# Patient Record
Sex: FEMALE | Race: WHITE | NOT HISPANIC OR LATINO | Employment: UNEMPLOYED | URBAN - METROPOLITAN AREA
[De-identification: names, ages, dates, MRNs, and addresses within clinical notes are randomized per-mention and may not be internally consistent; named-entity substitution may affect disease eponyms.]

---

## 2017-05-31 PROBLEM — M65.9 TENOSYNOVITIS OF FINGER AND HAND: Status: ACTIVE | Noted: 2017-05-31

## 2017-05-31 PROBLEM — M65.949 TENOSYNOVITIS OF FINGER AND HAND: Status: ACTIVE | Noted: 2017-05-31

## 2017-06-01 PROBLEM — E66.01 MORBID OBESITY: Status: ACTIVE | Noted: 2017-06-01

## 2017-06-01 PROBLEM — Z72.0 TOBACCO ABUSE: Status: ACTIVE | Noted: 2017-06-01

## 2017-06-01 PROBLEM — I10 HTN (HYPERTENSION), BENIGN: Status: ACTIVE | Noted: 2017-06-01

## 2017-06-12 ENCOUNTER — TELEPHONE (OUTPATIENT)
Dept: GASTROENTEROLOGY | Facility: CLINIC | Age: 57
End: 2017-06-12

## 2017-06-12 DIAGNOSIS — Z12.11 SPECIAL SCREENING FOR MALIGNANT NEOPLASMS, COLON: Primary | ICD-10-CM

## 2017-06-12 RX ORDER — SUMATRIPTAN SUCCINATE 25 MG/1
100 TABLET ORAL
Status: ON HOLD | COMMUNITY
End: 2021-12-13 | Stop reason: HOSPADM

## 2017-06-12 RX ORDER — LISINOPRIL 20 MG/1
40 TABLET ORAL DAILY
Status: ON HOLD | COMMUNITY
End: 2021-12-13 | Stop reason: HOSPADM

## 2017-06-12 NOTE — TELEPHONE ENCOUNTER
Reason for Colonoscopy Referral:   Any GI Symptoms: no   Last Colonoscopy: no  History of Colon Polyps: no  Family History of colon cancer or colon polyps (under 50- history of first degree relative w/colon cancer, what family member-age of onset: sister had colon cancer at 60 years of age.   Iron Deficiency/Anemia: no  Meds reviewed and updated: yes  Anti-inflammatory meds/NSAIDS: no  Allergies updated: yes  6 month surgical history: no  Blood Thinners:no   Lung disease: COPD  Heart disease: no  Valve replacement: no  Kidney disease: no  SCHEDULED: 6/23/17, patient will come to clinic to  prep instructions, called lucille into the pharmacy.

## 2017-06-23 PROBLEM — Z12.11 SCREENING FOR COLON CANCER: Status: ACTIVE | Noted: 2017-06-23

## 2017-07-11 ENCOUNTER — TELEPHONE (OUTPATIENT)
Dept: GASTROENTEROLOGY | Facility: CLINIC | Age: 57
End: 2017-07-11

## 2017-07-11 NOTE — LETTER
July 11, 2017    Halima Pruitt  [address] [facility] [address]             [facility]  [address], [address]   [facility] [address]  Phone: [phone]  Fax: [phone] Dear  Halima Pruitt:    Please call our office at your earliest convinience to discuss your pathology results, its urgent that you call us. [phone]        Sincerely,        ISAURA [address] office

## 2017-07-11 NOTE — LETTER
[facility]  [address], [address]   [place] [address]  Phone: [phone]  Fax: [phone]    Pt Name: Halima Pruitt  Injury Date:    Employee ID:  Date of First Treatment: 07/25/2017   Company: Networked reference to record EEP             Appointment Time:  Arrived:    Physician: Hamida Charles MA            Office Treatment: There are no diagnoses linked to this encounter.                Return Appointment: Visit date not found

## 2017-07-11 NOTE — TELEPHONE ENCOUNTER
----- Message from Honey Flores MD sent at 7/10/2017  5:33 PM CDT -----  Patient need to be seen by me in clinic on Wednesday 1 pm. Please call to inform her.

## 2017-07-25 NOTE — ADDENDUM NOTE
Encounter addended by: Hamida Charles MA on: 7/25/2017 10:31 AM<BR>    Actions taken: Letter status changed

## 2018-08-11 ENCOUNTER — ANESTHESIA EVENT (OUTPATIENT)
Dept: SURGERY | Facility: HOSPITAL | Age: 58
End: 2018-08-11
Payer: MEDICAID

## 2018-08-11 ENCOUNTER — ANESTHESIA (OUTPATIENT)
Dept: SURGERY | Facility: HOSPITAL | Age: 58
End: 2018-08-11
Payer: MEDICAID

## 2018-08-11 ENCOUNTER — HOSPITAL ENCOUNTER (OUTPATIENT)
Facility: HOSPITAL | Age: 58
Discharge: HOME | End: 2018-08-12
Attending: EMERGENCY MEDICINE | Admitting: SURGERY
Payer: MEDICAID

## 2018-08-11 DIAGNOSIS — J44.9 CHRONIC OBSTRUCTIVE PULMONARY DISEASE, UNSPECIFIED COPD TYPE: ICD-10-CM

## 2018-08-11 DIAGNOSIS — E66.01 MORBID OBESITY: ICD-10-CM

## 2018-08-11 DIAGNOSIS — K37 APPENDICITIS, UNSPECIFIED APPENDICITIS TYPE: ICD-10-CM

## 2018-08-11 DIAGNOSIS — Z72.0 TOBACCO ABUSE: ICD-10-CM

## 2018-08-11 DIAGNOSIS — Z12.11 SCREENING FOR COLON CANCER: ICD-10-CM

## 2018-08-11 DIAGNOSIS — I10 HTN (HYPERTENSION), BENIGN: ICD-10-CM

## 2018-08-11 DIAGNOSIS — K35.30 ACUTE APPENDICITIS WITH LOCALIZED PERITONITIS: Primary | ICD-10-CM

## 2018-08-11 PROCEDURE — 94799 UNLISTED PULMONARY SVC/PX: CPT

## 2018-08-11 PROCEDURE — 25000003 PHARM REV CODE 250: Performed by: SURGERY

## 2018-08-11 PROCEDURE — 37000008 HC ANESTHESIA 1ST 15 MINUTES: Performed by: SURGERY

## 2018-08-11 PROCEDURE — 93005 ELECTROCARDIOGRAM TRACING: CPT | Mod: 59

## 2018-08-11 PROCEDURE — 88304 TISSUE EXAM BY PATHOLOGIST: CPT | Mod: 26,,, | Performed by: PATHOLOGY

## 2018-08-11 PROCEDURE — 00840 ANES IPER PX LOWER ABD NOS: CPT | Performed by: SURGERY

## 2018-08-11 PROCEDURE — S0077 INJECTION, CLINDAMYCIN PHOSP: HCPCS | Performed by: EMERGENCY MEDICINE

## 2018-08-11 PROCEDURE — 25000003 PHARM REV CODE 250: Performed by: NURSE ANESTHETIST, CERTIFIED REGISTERED

## 2018-08-11 PROCEDURE — 37000009 HC ANESTHESIA EA ADD 15 MINS: Performed by: SURGERY

## 2018-08-11 PROCEDURE — G0378 HOSPITAL OBSERVATION PER HR: HCPCS

## 2018-08-11 PROCEDURE — 99285 EMERGENCY DEPT VISIT HI MDM: CPT | Mod: 25

## 2018-08-11 PROCEDURE — 96374 THER/PROPH/DIAG INJ IV PUSH: CPT | Mod: 59

## 2018-08-11 PROCEDURE — 27000221 HC OXYGEN, UP TO 24 HOURS

## 2018-08-11 PROCEDURE — 63600175 PHARM REV CODE 636 W HCPCS: Performed by: NURSE ANESTHETIST, CERTIFIED REGISTERED

## 2018-08-11 PROCEDURE — 93010 ELECTROCARDIOGRAM REPORT: CPT | Mod: ,,, | Performed by: INTERNAL MEDICINE

## 2018-08-11 PROCEDURE — 36000709 HC OR TIME LEV III EA ADD 15 MIN: Performed by: SURGERY

## 2018-08-11 PROCEDURE — 94640 AIRWAY INHALATION TREATMENT: CPT

## 2018-08-11 PROCEDURE — 25000242 PHARM REV CODE 250 ALT 637 W/ HCPCS: Performed by: EMERGENCY MEDICINE

## 2018-08-11 PROCEDURE — 94761 N-INVAS EAR/PLS OXIMETRY MLT: CPT | Mod: 59

## 2018-08-11 PROCEDURE — 71000033 HC RECOVERY, INTIAL HOUR: Performed by: SURGERY

## 2018-08-11 PROCEDURE — 25000003 PHARM REV CODE 250: Performed by: EMERGENCY MEDICINE

## 2018-08-11 PROCEDURE — 27201423 OPTIME MED/SURG SUP & DEVICES STERILE SUPPLY: Performed by: SURGERY

## 2018-08-11 PROCEDURE — 36000708 HC OR TIME LEV III 1ST 15 MIN: Performed by: SURGERY

## 2018-08-11 PROCEDURE — 88304 TISSUE EXAM BY PATHOLOGIST: CPT | Performed by: PATHOLOGY

## 2018-08-11 PROCEDURE — 96365 THER/PROPH/DIAG IV INF INIT: CPT

## 2018-08-11 RX ORDER — PROPOFOL 10 MG/ML
VIAL (ML) INTRAVENOUS
Status: DISCONTINUED | OUTPATIENT
Start: 2018-08-11 | End: 2018-08-11

## 2018-08-11 RX ORDER — HYDROMORPHONE HYDROCHLORIDE 2 MG/ML
0.5 INJECTION, SOLUTION INTRAMUSCULAR; INTRAVENOUS; SUBCUTANEOUS EVERY 5 MIN PRN
Status: DISCONTINUED | OUTPATIENT
Start: 2018-08-11 | End: 2018-08-11 | Stop reason: HOSPADM

## 2018-08-11 RX ORDER — EPHEDRINE SULFATE 50 MG/ML
INJECTION, SOLUTION INTRAVENOUS
Status: DISCONTINUED | OUTPATIENT
Start: 2018-08-11 | End: 2018-08-11

## 2018-08-11 RX ORDER — DEXAMETHASONE SODIUM PHOSPHATE 4 MG/ML
INJECTION, SOLUTION INTRA-ARTICULAR; INTRALESIONAL; INTRAMUSCULAR; INTRAVENOUS; SOFT TISSUE
Status: DISCONTINUED | OUTPATIENT
Start: 2018-08-11 | End: 2018-08-11

## 2018-08-11 RX ORDER — NEOSTIGMINE METHYLSULFATE 1 MG/ML
INJECTION, SOLUTION INTRAVENOUS
Status: DISCONTINUED | OUTPATIENT
Start: 2018-08-11 | End: 2018-08-11

## 2018-08-11 RX ORDER — ONDANSETRON 2 MG/ML
4 INJECTION INTRAMUSCULAR; INTRAVENOUS ONCE AS NEEDED
Status: DISCONTINUED | OUTPATIENT
Start: 2018-08-11 | End: 2018-08-11 | Stop reason: HOSPADM

## 2018-08-11 RX ORDER — CLINDAMYCIN PHOSPHATE 900 MG/50ML
900 INJECTION, SOLUTION INTRAVENOUS
Status: COMPLETED | OUTPATIENT
Start: 2018-08-11 | End: 2018-08-11

## 2018-08-11 RX ORDER — HYDROCODONE BITARTRATE AND ACETAMINOPHEN 5; 325 MG/1; MG/1
1 TABLET ORAL EVERY 4 HOURS PRN
Status: DISCONTINUED | OUTPATIENT
Start: 2018-08-11 | End: 2018-08-12 | Stop reason: HOSPADM

## 2018-08-11 RX ORDER — LIDOCAINE HCL/PF 100 MG/5ML
SYRINGE (ML) INTRAVENOUS
Status: DISCONTINUED | OUTPATIENT
Start: 2018-08-11 | End: 2018-08-11

## 2018-08-11 RX ORDER — SODIUM CHLORIDE 9 MG/ML
INJECTION, SOLUTION INTRAVENOUS CONTINUOUS
Status: DISCONTINUED | OUTPATIENT
Start: 2018-08-11 | End: 2018-08-12

## 2018-08-11 RX ORDER — ALBUTEROL SULFATE 2.5 MG/.5ML
2.5 SOLUTION RESPIRATORY (INHALATION)
Status: COMPLETED | OUTPATIENT
Start: 2018-08-11 | End: 2018-08-11

## 2018-08-11 RX ORDER — SODIUM CHLORIDE, SODIUM LACTATE, POTASSIUM CHLORIDE, CALCIUM CHLORIDE 600; 310; 30; 20 MG/100ML; MG/100ML; MG/100ML; MG/100ML
1000 INJECTION, SOLUTION INTRAVENOUS
Status: DISCONTINUED | OUTPATIENT
Start: 2018-08-11 | End: 2018-08-12

## 2018-08-11 RX ORDER — SODIUM CHLORIDE 0.9 % (FLUSH) 0.9 %
3 SYRINGE (ML) INJECTION EVERY 8 HOURS
Status: DISCONTINUED | OUTPATIENT
Start: 2018-08-11 | End: 2018-08-11 | Stop reason: HOSPADM

## 2018-08-11 RX ORDER — HYDROCODONE BITARTRATE AND ACETAMINOPHEN 10; 325 MG/1; MG/1
1 TABLET ORAL EVERY 4 HOURS PRN
Status: DISCONTINUED | OUTPATIENT
Start: 2018-08-11 | End: 2018-08-12 | Stop reason: HOSPADM

## 2018-08-11 RX ORDER — BUPIVACAINE HYDROCHLORIDE 2.5 MG/ML
INJECTION, SOLUTION EPIDURAL; INFILTRATION; INTRACAUDAL
Status: DISCONTINUED | OUTPATIENT
Start: 2018-08-11 | End: 2018-08-11 | Stop reason: HOSPADM

## 2018-08-11 RX ORDER — SODIUM CHLORIDE 9 MG/ML
INJECTION, SOLUTION INTRAVENOUS CONTINUOUS PRN
Status: DISCONTINUED | OUTPATIENT
Start: 2018-08-11 | End: 2018-08-11

## 2018-08-11 RX ORDER — SODIUM CHLORIDE 0.9 % (FLUSH) 0.9 %
3 SYRINGE (ML) INJECTION
Status: DISCONTINUED | OUTPATIENT
Start: 2018-08-11 | End: 2018-08-12 | Stop reason: HOSPADM

## 2018-08-11 RX ORDER — HYDROCODONE BITARTRATE AND ACETAMINOPHEN 5; 325 MG/1; MG/1
1 TABLET ORAL EVERY 6 HOURS PRN
Qty: 24 TABLET | Refills: 0 | Status: SHIPPED | OUTPATIENT
Start: 2018-08-11 | End: 2019-01-15

## 2018-08-11 RX ORDER — SUCCINYLCHOLINE CHLORIDE 20 MG/ML
INJECTION INTRAMUSCULAR; INTRAVENOUS
Status: DISCONTINUED | OUTPATIENT
Start: 2018-08-11 | End: 2018-08-11

## 2018-08-11 RX ORDER — GLYCOPYRROLATE 0.2 MG/ML
INJECTION INTRAMUSCULAR; INTRAVENOUS
Status: DISCONTINUED | OUTPATIENT
Start: 2018-08-11 | End: 2018-08-11

## 2018-08-11 RX ORDER — ONDANSETRON 2 MG/ML
INJECTION INTRAMUSCULAR; INTRAVENOUS
Status: DISCONTINUED | OUTPATIENT
Start: 2018-08-11 | End: 2018-08-11

## 2018-08-11 RX ORDER — FENTANYL CITRATE 50 UG/ML
INJECTION, SOLUTION INTRAMUSCULAR; INTRAVENOUS
Status: DISCONTINUED | OUTPATIENT
Start: 2018-08-11 | End: 2018-08-11

## 2018-08-11 RX ORDER — MIDAZOLAM HYDROCHLORIDE 1 MG/ML
INJECTION, SOLUTION INTRAMUSCULAR; INTRAVENOUS
Status: DISCONTINUED | OUTPATIENT
Start: 2018-08-11 | End: 2018-08-11

## 2018-08-11 RX ORDER — ROCURONIUM BROMIDE 10 MG/ML
INJECTION, SOLUTION INTRAVENOUS
Status: DISCONTINUED | OUTPATIENT
Start: 2018-08-11 | End: 2018-08-11

## 2018-08-11 RX ADMIN — GLYCOPYRROLATE 0.4 MG: 0.2 INJECTION, SOLUTION INTRAMUSCULAR; INTRAVENOUS at 08:08

## 2018-08-11 RX ADMIN — DEXAMETHASONE SODIUM PHOSPHATE 4 MG: 4 INJECTION, SOLUTION INTRAMUSCULAR; INTRAVENOUS at 07:08

## 2018-08-11 RX ADMIN — LIDOCAINE HYDROCHLORIDE 80 MG: 20 INJECTION, SOLUTION INTRAVENOUS at 07:08

## 2018-08-11 RX ADMIN — NEOSTIGMINE METHYLSULFATE 5 MG: 1 INJECTION INTRAVENOUS at 08:08

## 2018-08-11 RX ADMIN — PROPOFOL 150 MG: 10 INJECTION, EMULSION INTRAVENOUS at 07:08

## 2018-08-11 RX ADMIN — SODIUM CHLORIDE: 0.9 INJECTION, SOLUTION INTRAVENOUS at 07:08

## 2018-08-11 RX ADMIN — ROCURONIUM BROMIDE 15 MG: 10 INJECTION, SOLUTION INTRAVENOUS at 07:08

## 2018-08-11 RX ADMIN — CLINDAMYCIN IN 5 PERCENT DEXTROSE 900 MG: 18 INJECTION, SOLUTION INTRAVENOUS at 06:08

## 2018-08-11 RX ADMIN — EPHEDRINE SULFATE 10 MG: 50 INJECTION, SOLUTION INTRAMUSCULAR; INTRAVENOUS; SUBCUTANEOUS at 08:08

## 2018-08-11 RX ADMIN — ALBUTEROL SULFATE 2.5 MG: 2.5 SOLUTION RESPIRATORY (INHALATION) at 06:08

## 2018-08-11 RX ADMIN — ONDANSETRON 4 MG: 2 INJECTION, SOLUTION INTRAMUSCULAR; INTRAVENOUS at 07:08

## 2018-08-11 RX ADMIN — SODIUM CHLORIDE, SODIUM LACTATE, POTASSIUM CHLORIDE, AND CALCIUM CHLORIDE 1000 ML: .6; .31; .03; .02 INJECTION, SOLUTION INTRAVENOUS at 06:08

## 2018-08-11 RX ADMIN — SUCCINYLCHOLINE CHLORIDE 120 MG: 20 INJECTION, SOLUTION INTRAMUSCULAR; INTRAVENOUS at 07:08

## 2018-08-11 RX ADMIN — FENTANYL CITRATE 100 MCG: 50 INJECTION, SOLUTION INTRAMUSCULAR; INTRAVENOUS at 07:08

## 2018-08-11 RX ADMIN — SODIUM CHLORIDE: 0.9 INJECTION, SOLUTION INTRAVENOUS at 10:08

## 2018-08-11 RX ADMIN — ROCURONIUM BROMIDE 5 MG: 10 INJECTION, SOLUTION INTRAVENOUS at 07:08

## 2018-08-11 RX ADMIN — MIDAZOLAM 2 MG: 1 INJECTION INTRAMUSCULAR; INTRAVENOUS at 07:08

## 2018-08-11 NOTE — ED NOTES
Patient transferred for acute appendicitis.  Currently denies pain, already has been given IV, Zosyn, Morphine and Zofran prior to arrival.  No needs verbalized when asked

## 2018-08-11 NOTE — ANESTHESIA PREPROCEDURE EVALUATION
08/11/2018  Halima Pruitt is a 58 y.o., female with acute appendicitis. Scheduled for appendectomy.    Past Medical History:   Diagnosis Date    Anxiety     COPD (chronic obstructive pulmonary disease)     Depression     HTN (hypertension), benign 6/1/2017    Seizures     last 34 yreas ago     Past Surgical History:   Procedure Laterality Date    CHOLECYSTECTOMY      COLONOSCOPY N/A 6/23/2017    Procedure: COLONOSCOPY;  Surgeon: Honey Flores MD;  Location: [facility];  Service: Endoscopy;  Laterality: N/A;    HYSTERECTOMY       Review of patient's allergies indicates:   Allergen Reactions    Penicillins Hives    Sulfa (sulfonamide antibiotics) Hives       Recent Labs  Lab 08/11/18  1320   WBC 17.96*   RBC 5.13   HGB 15.1   HCT 47.2      MCV 92   MCH 29.4   MCHC 32.0     BMP  Lab Results   Component Value Date     08/11/2018    K 4.2 08/11/2018     08/11/2018    CO2 28 08/11/2018    BUN 6 (L) 08/11/2018    CREATININE 0.62 08/11/2018    CALCIUM 8.7 08/11/2018    ANIONGAP 9 08/11/2018    ESTGFRAFRICA >60.0 08/11/2018    EGFRNONAA >60.0 08/11/2018     No current facility-administered medications on file prior to encounter.      Current Outpatient Prescriptions on File Prior to Encounter   Medication Sig Dispense Refill    ALBUTEROL SULFATE (VENTOLIN INHL) Inhale into the lungs.      aspirin 81 MG Chew Take 81 mg by mouth once daily.      cyclobenzaprine (FLEXERIL) 10 MG tablet Take 10 mg by mouth 3 (three) times daily as needed for Muscle spasms.      fluticasone-vilanterol (BREO ELLIPTA) 200-25 mcg/dose DsDv diskus inhaler Inhale 1 puff into the lungs once daily. Controller      ibuprofen (ADVIL,MOTRIN) 800 MG tablet Take 800 mg by mouth 3 (three) times daily.      lisinopril (PRINIVIL,ZESTRIL) 20 MG tablet Take 40 mg by mouth once daily.       MOMETASONE/FORMOTEROL  (DULERA INHL) Inhale into the lungs.      omeprazole (PRILOSEC) 40 MG capsule Take 40 mg by mouth once daily.      oxybutynin (DITROPAN) 5 MG Tab Take 5 mg by mouth 3 (three) times daily.       sumatriptan (IMITREX) 25 MG Tab Take 50 mg by mouth every 2 (two) hours as needed.      topiramate (TOPAMAX) 50 MG tablet Take 50 mg by mouth 2 (two) times daily.      trazodone (DESYREL) 100 MG tablet Take 100 mg by mouth every evening.      venlafaxine (EFFEXOR) 75 MG tablet Take 75 mg by mouth once daily.       venlafaxine (EFFEXOR) 75 MG tablet Take 150 mg by mouth once daily.           Anesthesia Evaluation    I have reviewed the Patient Summary Reports.     I have reviewed the Medications.     Review of Systems  Anesthesia Hx:  No problems with previous Anesthesia  History of prior surgery of interest to airway management or planning: Previous anesthesia: General, MAC Denies Family Hx of Anesthesia complications.   Denies Personal Hx of Anesthesia complications.   Social:  Smoker 80 pack years   Hematology/Oncology:         -- Denies Anemia:   Cardiovascular:   Hypertension Denies MI.  Denies CAD.     Denies Angina.    Pulmonary:   COPD, moderate Shortness of breath Getting breathing treatment prior to OR   Renal/:  Renal/ Normal     Hepatic/GI:   appendicitis   Musculoskeletal:  Musculoskeletal Normal    Neurological:  Neurology Normal    Endocrine:  Endocrine Normal    Psych:   Psychiatric History anxiety depression          Physical Exam  General:  Morbid Obesity    Airway/Jaw/Neck:  Airway Findings: Mouth Opening: Normal General Airway Assessment: Adult  Mallampati: III  Improves to II with phonation.  TM Distance: Normal, at least 6 cm  Jaw/Neck Findings:  Neck ROM: Normal ROM      Dental:  Dental Findings: Edentulous   Chest/Lungs:  Chest/Lungs Findings: Expiratory Wheezes, Mod., Decreased Breath Sounds Bilateral     Heart/Vascular:  Heart Findings: Rate: Normal        Mental Status:  Mental Status  Findings:  Alert and Oriented, Anxious         Anesthesia Plan  Type of Anesthesia, risks & benefits discussed:  Anesthesia Type:  general  Patient's Preference:   Intra-op Monitoring Plan: standard ASA monitors  Intra-op Monitoring Plan Comments:   Post Op Pain Control Plan: multimodal analgesia and per primary service following discharge from PACU  Post Op Pain Control Plan Comments:   Induction:   IV  Beta Blocker:  Patient is not currently on a Beta-Blocker (No further documentation required).       Informed Consent: Patient understands risks and agrees with Anesthesia plan.  Questions answered. Anesthesia consent signed with patient.  ASA Score: 3     Day of Surgery Review of History & Physical:    H&P update referred to the surgeon.         Ready For Surgery From Anesthesia Perspective.

## 2018-08-11 NOTE — H&P
Today`s Date: 8/11/2018     Admit Date: 8/11/2018    Admitting Physician: Poncho Bosch MD    Patient`s Name: Halima Pruitt , 58 y.o. female    HISTORY AND CHIEF COMPLAINT  C/o sudden onset of right lower quadrant associated with nausea and vomiting , no diarrhea , no constipation  Patient Active Problem List   Diagnosis    Tenosynovitis of finger and hand    HTN (hypertension), benign    COPD (chronic obstructive pulmonary disease)    Tobacco abuse    Morbid obesity    Screening for colon cancer       Past Medical History:   Diagnosis Date    Anxiety     COPD (chronic obstructive pulmonary disease)     Depression     HTN (hypertension), benign 6/1/2017    Seizures     last 34 yreas ago       Past Surgical History:   Procedure Laterality Date    CHOLECYSTECTOMY      COLONOSCOPY N/A 6/23/2017    Procedure: COLONOSCOPY;  Surgeon: Honey Flores MD;  Location: [facility];  Service: Endoscopy;  Laterality: N/A;    HYSTERECTOMY         Prior to Admission medications    Medication Sig Start Date End Date Taking? Authorizing Provider   ALBUTEROL SULFATE (VENTOLIN INHL) Inhale into the lungs.    Historical Provider, MD   aspirin 81 MG Chew Take 81 mg by mouth once daily.    Historical Provider, MD   cyclobenzaprine (FLEXERIL) 10 MG tablet Take 10 mg by mouth 3 (three) times daily as needed for Muscle spasms.    Historical Provider, MD   fluticasone-vilanterol (BREO ELLIPTA) 200-25 mcg/dose DsDv diskus inhaler Inhale 1 puff into the lungs once daily. Controller    Historical Provider, MD   ibuprofen (ADVIL,MOTRIN) 800 MG tablet Take 800 mg by mouth 3 (three) times daily.    Historical Provider, MD   lisinopril (PRINIVIL,ZESTRIL) 20 MG tablet Take 40 mg by mouth once daily.     Historical Provider, MD   MOMETASONE/FORMOTEROL (DULERA INHL) Inhale into the lungs.    Historical Provider, MD   omeprazole (PRILOSEC) 40 MG capsule Take 40 mg by mouth once daily.    Historical Provider, MD   oxybutynin  (DITROPAN) 5 MG Tab Take 5 mg by mouth 3 (three) times daily.     Historical Provider, MD   sumatriptan (IMITREX) 25 MG Tab Take 50 mg by mouth every 2 (two) hours as needed.    Historical Provider, MD   topiramate (TOPAMAX) 50 MG tablet Take 50 mg by mouth 2 (two) times daily.    Historical Provider, MD   trazodone (DESYREL) 100 MG tablet Take 100 mg by mouth every evening.    Historical Provider, MD   venlafaxine (EFFEXOR) 75 MG tablet Take 75 mg by mouth once daily.     Historical Provider, MD   venlafaxine (EFFEXOR) 75 MG tablet Take 150 mg by mouth once daily.    Historical Provider, MD     Current Facility-Administered Medications on File Prior to Encounter   Medication Dose Route Frequency Provider Last Rate Last Dose    [COMPLETED] dextrose 5 % and 0.45 % NaCl infusion  1,000 mL Intravenous ED 1 Time Abraham Ridley  mL/hr at 08/11/18 1607 1,000 mL at 08/11/18 1607    [COMPLETED] ketorolac injection 15 mg  15 mg Intravenous ED 1 Time Abraham Ridley MD   15 mg at 08/11/18 1347    [COMPLETED] morphine injection 6 mg  6 mg Intravenous ED 1 Time Abraham Ridley MD   6 mg at 08/11/18 1347    [COMPLETED] ondansetron injection 4 mg  4 mg Intravenous ED 1 Time Abraham Ridley MD   4 mg at 08/11/18 1324    [DISCONTINUED] levoFLOXacin 500 mg/100 mL IVPB 500 mg  500 mg Intravenous Q24H Abraham Ridley  mL/hr at 08/11/18 1604 500 mg at 08/11/18 1604    [DISCONTINUED] metronidazole IVPB 500 mg  500 mg Intravenous ED 1 Time Abraham Ridley MD         Current Outpatient Prescriptions on File Prior to Encounter   Medication Sig Dispense Refill    ALBUTEROL SULFATE (VENTOLIN INHL) Inhale into the lungs.      aspirin 81 MG Chew Take 81 mg by mouth once daily.      cyclobenzaprine (FLEXERIL) 10 MG tablet Take 10 mg by mouth 3 (three) times daily as needed for Muscle spasms.      fluticasone-vilanterol (BREO ELLIPTA) 200-25 mcg/dose DsDv diskus inhaler Inhale 1 puff into the lungs once daily.  Controller      ibuprofen (ADVIL,MOTRIN) 800 MG tablet Take 800 mg by mouth 3 (three) times daily.      lisinopril (PRINIVIL,ZESTRIL) 20 MG tablet Take 40 mg by mouth once daily.       MOMETASONE/FORMOTEROL (DULERA INHL) Inhale into the lungs.      omeprazole (PRILOSEC) 40 MG capsule Take 40 mg by mouth once daily.      oxybutynin (DITROPAN) 5 MG Tab Take 5 mg by mouth 3 (three) times daily.       sumatriptan (IMITREX) 25 MG Tab Take 50 mg by mouth every 2 (two) hours as needed.      topiramate (TOPAMAX) 50 MG tablet Take 50 mg by mouth 2 (two) times daily.      trazodone (DESYREL) 100 MG tablet Take 100 mg by mouth every evening.      venlafaxine (EFFEXOR) 75 MG tablet Take 75 mg by mouth once daily.       venlafaxine (EFFEXOR) 75 MG tablet Take 150 mg by mouth once daily.          Review of patient's allergies indicates:   Allergen Reactions    Penicillins Hives    Sulfa (sulfonamide antibiotics) Hives       Social History:   reports that she has been smoking Cigarettes.  She has a 40.00 pack-year smoking history. She has never used smokeless tobacco. She reports that she drinks alcohol. She reports that she does not use drugs.     History reviewed. No pertinent family history.    PHYSICAL EXAMINATION  Temp:  [98 °F (36.7 °C)-98.6 °F (37 °C)] 98.6 °F (37 °C)  Pulse:  [75-88] 78  Resp:  [18-22] 18  SpO2:  [96 %-100 %] 100 %  BP: (129-163)/(65-81) 148/78    General Condition:   alert x 3    Head & Neck  Anemia: None  Jaundice: None  Neck vein: Not distended  Carotid Bruits: none  Lymph nodes: none palpable  Thyroid: normal    Chest: normal    Heart: normal    Rt. Breast: not examined  Lt. Breast: not examined  Axillary lymph nodes: none    Abdomen: Soft,  tender with no palpable mass or organ, mild localized tenderness, scar kailee of cholecystectomy  Hernia: none    Rectal: Defered    Extremities: normal    Vascular: normal    Specific focus Examination    Imp: acute appendicitis , hypertension , obesity,  copd     Plan: Lap Appendectomy possible open now

## 2018-08-11 NOTE — PROVIDER PROGRESS NOTES - EMERGENCY DEPT.
Encounter Date: 8/11/2018    ED Physician Progress Notes       SCRIBE NOTE: I, Laura Briscoe, am scribing for, and in the presence of,  Dr. Bosch.  Physician Statement: I, Dr. Bosch, personally performed the services described in this documentation as scribed by Laura Briscoe in my presence, and it is both accurate and complete.        6:10 PM Case discussed with  of General Surgery, notified patient is in the ED, on the way to see the patient and bring to OR    The patient states her pain is okay at the moment and declines any further pain medication at this time.     6:18 PM Dr. Street is here in the ED about to evaluate the patient    Pre-op EKG:  Normal sinus rhythm at 75 bpm, nl axis, nl intervals, no hypertrophy, no ST-T changes as read by me (Dr. Bosch).    Patient will be taken to the operating room by Dr. Street.     IMPRESSION:  1. Acute appendicitis with localized peritonitis    2. Tobacco abuse    3. Morbid obesity    4. Screening for colon cancer    5. HTN (hypertension), benign    6. Chronic obstructive pulmonary disease, unspecified COPD type    7. Appendicitis, unspecified appendicitis type

## 2018-08-12 VITALS
WEIGHT: 251.31 LBS | OXYGEN SATURATION: 93 % | HEART RATE: 71 BPM | BODY MASS INDEX: 46.25 KG/M2 | RESPIRATION RATE: 18 BRPM | TEMPERATURE: 98 F | HEIGHT: 62 IN | SYSTOLIC BLOOD PRESSURE: 134 MMHG | DIASTOLIC BLOOD PRESSURE: 64 MMHG

## 2018-08-12 PROCEDURE — 27000221 HC OXYGEN, UP TO 24 HOURS

## 2018-08-12 PROCEDURE — G0378 HOSPITAL OBSERVATION PER HR: HCPCS

## 2018-08-12 PROCEDURE — 25000003 PHARM REV CODE 250: Performed by: SURGERY

## 2018-08-12 PROCEDURE — 94799 UNLISTED PULMONARY SVC/PX: CPT

## 2018-08-12 PROCEDURE — 94761 N-INVAS EAR/PLS OXIMETRY MLT: CPT

## 2018-08-12 RX ADMIN — HYDROCODONE BITARTRATE AND ACETAMINOPHEN 1 TABLET: 10; 325 TABLET ORAL at 01:08

## 2018-08-12 NOTE — ANESTHESIA POSTPROCEDURE EVALUATION
"Anesthesia Post Evaluation    Patient: Halima Pruitt    Procedure(s) Performed: Procedure(s) (LRB):  APPENDECTOMY, LAPAROSCOPIC (N/A)    Final Anesthesia Type: general  Patient location during evaluation: PACU  Patient participation: Yes- Able to Participate  Level of consciousness: awake and alert  Post-procedure vital signs: reviewed and stable  Pain management: adequate  Airway patency: patent  PONV status at discharge: No PONV  Anesthetic complications: no      Cardiovascular status: blood pressure returned to baseline  Respiratory status: unassisted and nasal cannula  Hydration status: euvolemic  Follow-up not needed.        Visit Vitals  BP (!) 109/57 (BP Location: Right arm, Patient Position: Lying)   Pulse 93   Temp 36.7 °C (98 °F) (Oral)   Resp 18   Ht 5' 2" (1.575 m)   Wt 108.9 kg (240 lb)   SpO2 (!) 93%   BMI 43.90 kg/m²       Pain/Jessica Score: Presence of Pain: complains of pain/discomfort (8/11/2018  9:08 PM)  Pain Rating Prior to Med Admin: 8 (8/11/2018  1:47 PM)  Jessica Score: 9 (8/11/2018  9:08 PM)      "

## 2018-08-12 NOTE — TRANSFER OF CARE
"Anesthesia Transfer of Care Note    Patient: Halima Pruitt    Procedure(s) Performed: Procedure(s) (LRB):  APPENDECTOMY, LAPAROSCOPIC (N/A)    Patient location: PACU    Anesthesia Type: general    Transport from OR: Transported from OR on 6-10 L/min O2 by face mask with adequate spontaneous ventilation    Post pain: adequate analgesia    Post assessment: tolerated procedure well and no apparent anesthetic complications    Post vital signs: stable    Level of consciousness: awake, alert and oriented    Nausea/Vomiting: no nausea/vomiting    Complications: none    Transfer of care protocol was followed      Last vitals:   Visit Vitals  BP (!) 148/78 (BP Location: Left arm, Patient Position: Sitting)   Pulse 78   Temp 37 °C (98.6 °F) (Oral)   Resp 20   Ht 5' 2" (1.575 m)   Wt 108.9 kg (240 lb)   SpO2 (!) 93%   BMI 43.90 kg/m²     "

## 2018-08-12 NOTE — PROGRESS NOTES
"Surgery follow up  BP (!) 142/74 (Patient Position: Lying)   Pulse 66   Temp 98.4 °F (36.9 °C) (Oral)   Resp 18   Ht 5' 2" (1.575 m)   Wt 114 kg (251 lb 5.2 oz)   SpO2 98%   Breastfeeding? No   BMI 45.97 kg/m²   I/O last 3 completed shifts:  In: 1650 [P.O.:750; I.V.:900]  Out: 700 [Urine:700]  No intake/output data recorded.  Tolerating diet   discharge home today.  "

## 2018-08-12 NOTE — PLAN OF CARE
Discharge orders noted, no HH or HME ordered.    Future Appointments   Date Time Provider Department Center   8/22/2018  1:40 PM KRISSY ParedesCO [facility] [place]       Pt's nurse will go over medications/signs and symptoms prior to discharge       08/12/18 0642   Final Note   Assessment Type Discharge Planning Assessment   Discharge Disposition Home   What phone number can be called within the next 1-3 days to see how you are doing after discharge? [phone]   Hospital Follow Up  Appt(s) scheduled? No  (Offices closed for Weekend, Patient to schedule own follow up appointment)     Reina Diaz, RN Transitional Navigator  [phone]

## 2018-08-12 NOTE — OP NOTE
DATE OF PROCEDURE:  08/11/2018    PREOPERATIVE DIAGNOSIS:  Acute appendicitis, obesity, hypertension, COPD,   chronic smoker.    POSTOPERATIVE DIAGNOSIS:  Acute appendicitis, obesity, hypertension, COPD,   chronic smoker.    OPERATION:  Laparoscopic appendectomy.    SURGEON:  Contreras Street M.D.    ANESTHESIA:  General.    ASSISTANT:  Not applicable.    ESTIMATED BLOOD LOSS:  20 mL.    SPECIMEN REMOVED:  Appendix.    PROCEDURE AND FINDINGS:  This patient was admitted through the Emergency Room as   a transfer from [place] where the patient presented with right lower   quadrant pain.  The patient was worked up and had a CT scan done that showed   acute appendicitis.  The patient was transferred to [place] through the Emergency   Room, was given IV antibiotics, was taken to the Operating Room.  After   satisfactory general endotracheal anesthesia, the patient in supine position,   abdomen was prepped and draped in normal sterile manner using ChloraPrep.  A   small supraumbilical incision was made.  Veress needle was introduced.    Abdominal cavity was insufflated using CO2 up to a pressure of 15 mmHg. A 12 mm   trocar cannula was introduced through the umbilicus and incision, one 5-mm probe   right lower quadrant and one hypogastric area after properly positioning the   patient, mesentery of the appendix was then treated with LigaSure and base of   the appendix was then dissected out.  Camera was then switched and base of the   appendix was treated with Endo-DORI.  Specimen was retrieved through the   umbilical incision.  Hemostasis satisfactorily maintained.  The wound was   irrigated with normal saline solution.  Hemostasis was perfectly maintained.    Abdominal cavity was adequately desufflated.  All ports were withdrawn under   direct vision.  Closure of all wounds was performed, 0 Vicryl for the fascia.    Skin was closed using 4-0 Monocryl.  Sterile gauze dressing was applied.  The   instrument count,  sponge count, and needle count was correct.  The patient   tolerated it well.  Estimated blood loss was 20 mL.    SPECIMEN REMOVED:  Appendix.    DISCHARGE DIAGNOSIS:  Acute appendicitis. hypertension, obesity, COPD.      MS/HN  dd: 08/11/2018 20:31:44 (CDT)  td: 08/11/2018 22:09:40 (CDT)  Doc ID   #[id number]  Job ID #[id number]    CC:

## 2018-08-12 NOTE — OP NOTE
Discharge Summary/Operative Note       Surgery Date: 8/11/2018     Surgeon(s) and Role:     * Contreras Street MD - Primary    Pre-op Diagnosis:  Appendicitis [K37]    Post-op Diagnosis: Post-Op Diagnosis Codes:     * Appendicitis [K37]    Procedure(s) (LRB):  APPENDECTOMY, LAPAROSCOPIC (N/A)    Anesthesia: General    Procedure in Detail/Findings:  Laparoscopic appendectomy done under general anaesthesia . Patient tolerated well. No itraop complication. Patient was snet to recovery room in stable condition.    Estimated Blood Loss: 20 cc         Specimens     Start     Ordered    08/11/18 2003  Specimen to Pathology - Surgery  Once     Appandix 08/11/18 2004        Implants: * No implants in log *           Disposition: PACU - hemodynamically stable.           Condition: Good    Attestation:  I performed the procedure.           Discharge Note    Admit Date: 8/11/2018    Attending Physician: No att. providers found     Discharge Physician: No att. providers found    Final Diagnosis: Post-Op Diagnosis Codes:     * Appendicitis [K37]    Disposition: Still a Patient    Patient Instructions:   Current Discharge Medication List      START taking these medications    Details   HYDROcodone-acetaminophen (NORCO) 5-325 mg per tablet Take 1 tablet by mouth every 6 (six) hours as needed for Pain.  Qty: 24 tablet, Refills: 0         CONTINUE these medications which have NOT CHANGED    Details   ALBUTEROL SULFATE (VENTOLIN INHL) Inhale into the lungs.      aspirin 81 MG Chew Take 81 mg by mouth once daily.      cyclobenzaprine (FLEXERIL) 10 MG tablet Take 10 mg by mouth 3 (three) times daily as needed for Muscle spasms.      fluticasone-vilanterol (BREO ELLIPTA) 200-25 mcg/dose DsDv diskus inhaler Inhale 1 puff into the lungs once daily. Controller      ibuprofen (ADVIL,MOTRIN) 800 MG tablet Take 800 mg by mouth 3 (three) times daily.      lisinopril (PRINIVIL,ZESTRIL) 20 MG tablet Take 40 mg by mouth once daily.        MOMETASONE/FORMOTEROL (DULERA INHL) Inhale into the lungs.      omeprazole (PRILOSEC) 40 MG capsule Take 40 mg by mouth once daily.      oxybutynin (DITROPAN) 5 MG Tab Take 5 mg by mouth 3 (three) times daily.       sumatriptan (IMITREX) 25 MG Tab Take 50 mg by mouth every 2 (two) hours as needed.      topiramate (TOPAMAX) 50 MG tablet Take 50 mg by mouth 2 (two) times daily.      trazodone (DESYREL) 100 MG tablet Take 100 mg by mouth every evening.      !! venlafaxine (EFFEXOR) 75 MG tablet Take 75 mg by mouth once daily.       !! venlafaxine (EFFEXOR) 75 MG tablet Take 150 mg by mouth once daily.       !! - Potential duplicate medications found. Please discuss with provider.           Regular diet, no heavy lifting , return to office one week, , keep dressing dry and intact    Discharge Procedure Orders (must include Diet, Follow-up, Activity)  Diet general     Keep surgical extremity elevated     Lifting restrictions     Call MD for:  temperature >100.4     Call MD for:  persistent nausea and vomiting     Call MD for:  severe uncontrolled pain     Call MD for:  redness, tenderness, or signs of infection (pain, swelling, redness, odor or green/yellow discharge around incision site)     Leave dressing on - Keep it clean, dry, and intact until clinic visit          Discharge Date: 8/12/2018

## 2018-08-12 NOTE — PLAN OF CARE
.Patient has met PACU discharge criteria, VSS, pain well controlled. Family updated by phone. Released from PACU by  *

## 2018-08-12 NOTE — PLAN OF CARE
Problem: Patient Care Overview  Goal: Plan of Care Review  PLAN OF CARE REVIEWED WITH PT. ARRIVED TO UNIT FOLLOWING SURGERY . O22LNC WITH 93 TO 95% SATS . PUNCTURE SITES X 3 NOTED GAUZE 4X4 CDI . RT WRIST 22G SL W/ DSG CDI . RADHA PO DIET POST OP . LIANE SCDS AS ORDERED . WILL CONTINUE TO MONITOR . CALL LIGHT IN REACH .

## 2018-08-12 NOTE — PROGRESS NOTES
Patient is AAOx3, NAD noted, VSS. Dressing changed to three lap sites.  Reviewed discharge instructions with patient.  Patient verbalized understanding.  Dr. Street gave patient paper prescription for pain medication.  Safety maintained.  Waiting on ride.

## 2018-08-12 NOTE — PLAN OF CARE
Problem: Patient Care Overview  Goal: Plan of Care Review  Outcome: Ongoing (interventions implemented as appropriate)  Patient on oxygen with documented flow.  Will attempt to wean per O2 order protocol. Patient performs IS therapy. Will continue to monitor.

## 2018-08-14 NOTE — DISCHARGE SUMMARY
DATE OF ADMISSION: 08/11/2018    DATE OF DISCHARGE:  08/12/2018    HISTORY OF PRESENT ILLNESS:  This 58-year-old female was transferred from [facility] where the patient presented with lower abdominal pain   associated with nausea and vomiting.  The patient was worked up and underwent CT   scan and WBC count was elevated.  The patient showed acute appendicitis.  The   patient gave history of hypertension, COPD, obesity and chronic smoking.  The   patient admitted for observation, taken to the Operating Room and underwent   laparoscopic appendectomy.  The patient was admitted overnight to continue   followup.  The patient was doing much better, was out of bed ambulating, eating,   moving bowels, no fever or chills.  No trouble urinating.  Wound was healing   well.  The patient was afebrile.  The patient was discharged home on p.o. pain   pill.  Advised not to do any heavy lifting, can take shower, keep the dressing   dry and can drive.  To see me in the office in 1 week.  Given prescription for   pain pill, advised to come to the Emergency Room if any abnormal symptoms comes,   fever, increased abdominal pain, nausea, vomiting or any other abdominal   discomfort.    DISCHARGE DIAGNOSIS:  1.  Acute appendicitis.  2.  Hypertension.  3.  Obesity.  4.  Chronic obstructive pulmonary disease.  5.  Chronic smoking.      MS/HN  dd: 08/13/2018 10:13:36 (CDT)  td: 08/13/2018 20:16:56 (CDT)  Doc ID   #[id number]  Job ID #[id number]    CC:

## 2018-08-15 NOTE — ED PROVIDER NOTES
Patient`s Name: Halima Pruitt , 58 y.o. female     HISTORY AND CHIEF COMPLAINT  C/o sudden onset of right lower quadrant associated with nausea and vomiting , no diarrhea , no constipation      Patient Active Problem List   Diagnosis    Tenosynovitis of finger and hand    HTN (hypertension), benign    COPD (chronic obstructive pulmonary disease)    Tobacco abuse    Morbid obesity    Screening for colon cancer              Past Medical History:   Diagnosis Date    Anxiety      COPD (chronic obstructive pulmonary disease)      Depression      HTN (hypertension), benign 6/1/2017    Seizures       last 34 yreas ago               Past Surgical History:   Procedure Laterality Date    CHOLECYSTECTOMY        COLONOSCOPY N/A 6/23/2017     Procedure: COLONOSCOPY;  Surgeon: Honey Flores MD;  Location: [facility];  Service: Endoscopy;  Laterality: N/A;    HYSTERECTOMY                     Prior to Admission medications    Medication Sig Start Date End Date Taking? Authorizing Provider   ALBUTEROL SULFATE (VENTOLIN INHL) Inhale into the lungs.       Historical Provider, MD   aspirin 81 MG Chew Take 81 mg by mouth once daily.       Historical Provider, MD   cyclobenzaprine (FLEXERIL) 10 MG tablet Take 10 mg by mouth 3 (three) times daily as needed for Muscle spasms.       Historical Provider, MD   fluticasone-vilanterol (BREO ELLIPTA) 200-25 mcg/dose DsDv diskus inhaler Inhale 1 puff into the lungs once daily. Controller       Historical Provider, MD   ibuprofen (ADVIL,MOTRIN) 800 MG tablet Take 800 mg by mouth 3 (three) times daily.       Historical Provider, MD   lisinopril (PRINIVIL,ZESTRIL) 20 MG tablet Take 40 mg by mouth once daily.        Historical Provider, MD   MOMETASONE/FORMOTEROL (DULERA INHL) Inhale into the lungs.       Historical Provider, MD   omeprazole (PRILOSEC) 40 MG capsule Take 40 mg by mouth once daily.       Historical Provider, MD   oxybutynin (DITROPAN) 5 MG Tab Take 5 mg by  mouth 3 (three) times daily.        Historical Provider, MD   sumatriptan (IMITREX) 25 MG Tab Take 50 mg by mouth every 2 (two) hours as needed.       Historical Provider, MD   topiramate (TOPAMAX) 50 MG tablet Take 50 mg by mouth 2 (two) times daily.       Historical Provider, MD   trazodone (DESYREL) 100 MG tablet Take 100 mg by mouth every evening.       Historical Provider, MD   venlafaxine (EFFEXOR) 75 MG tablet Take 75 mg by mouth once daily.        Historical Provider, MD   venlafaxine (EFFEXOR) 75 MG tablet Take 150 mg by mouth once daily.       Historical Provider, MD                Current Facility-Administered Medications on File Prior to Encounter   Medication Dose Route Frequency Provider Last Rate Last Dose    [COMPLETED] dextrose 5 % and 0.45 % NaCl infusion  1,000 mL Intravenous ED 1 Time Abraham Ridley  mL/hr at 08/11/18 1607 1,000 mL at 08/11/18 1607    [COMPLETED] ketorolac injection 15 mg  15 mg Intravenous ED 1 Time Abraham Ridley MD   15 mg at 08/11/18 1347    [COMPLETED] morphine injection 6 mg  6 mg Intravenous ED 1 Time Abraham Ridley MD   6 mg at 08/11/18 1347    [COMPLETED] ondansetron injection 4 mg  4 mg Intravenous ED 1 Time Abraham Ridley MD   4 mg at 08/11/18 1324    [DISCONTINUED] levoFLOXacin 500 mg/100 mL IVPB 500 mg  500 mg Intravenous Q24H Abraham Ridley  mL/hr at 08/11/18 1604 500 mg at 08/11/18 1604    [DISCONTINUED] metronidazole IVPB 500 mg  500 mg Intravenous ED 1 Time Abraham Ridley MD                 Current Outpatient Prescriptions on File Prior to Encounter   Medication Sig Dispense Refill    ALBUTEROL SULFATE (VENTOLIN INHL) Inhale into the lungs.        aspirin 81 MG Chew Take 81 mg by mouth once daily.        cyclobenzaprine (FLEXERIL) 10 MG tablet Take 10 mg by mouth 3 (three) times daily as needed for Muscle spasms.        fluticasone-vilanterol (BREO ELLIPTA) 200-25 mcg/dose DsDv diskus inhaler Inhale 1 puff into the lungs once  daily. Controller        ibuprofen (ADVIL,MOTRIN) 800 MG tablet Take 800 mg by mouth 3 (three) times daily.        lisinopril (PRINIVIL,ZESTRIL) 20 MG tablet Take 40 mg by mouth once daily.         MOMETASONE/FORMOTEROL (DULERA INHL) Inhale into the lungs.        omeprazole (PRILOSEC) 40 MG capsule Take 40 mg by mouth once daily.        oxybutynin (DITROPAN) 5 MG Tab Take 5 mg by mouth 3 (three) times daily.         sumatriptan (IMITREX) 25 MG Tab Take 50 mg by mouth every 2 (two) hours as needed.        topiramate (TOPAMAX) 50 MG tablet Take 50 mg by mouth 2 (two) times daily.        trazodone (DESYREL) 100 MG tablet Take 100 mg by mouth every evening.        venlafaxine (EFFEXOR) 75 MG tablet Take 75 mg by mouth once daily.         venlafaxine (EFFEXOR) 75 MG tablet Take 150 mg by mouth once daily.                  Review of patient's allergies indicates:   Allergen Reactions    Penicillins Hives    Sulfa (sulfonamide antibiotics) Hives         Social History:   reports that she has been smoking Cigarettes.  She has a 40.00 pack-year smoking history. She has never used smokeless tobacco. She reports that she drinks alcohol. She reports that she does not use drugs.      History reviewed. No pertinent family history.     PHYSICAL EXAMINATION  Temp:  [98 °F (36.7 °C)-98.6 °F (37 °C)] 98.6 °F (37 °C)  Pulse:  [75-88] 78  Resp:  [18-22] 18  SpO2:  [96 %-100 %] 100 %  BP: (129-163)/(65-81) 148/78     General Condition:   alert x 3     Head & Neck  Anemia: None  Jaundice: None  Neck vein: Not distended  Carotid Bruits: none  Lymph nodes: none palpable  Thyroid: normal     Chest: normal     Heart: normal     Rt. Breast: not examined  Lt. Breast: not examined  Axillary lymph nodes: none     Abdomen: Soft,  tender with no palpable mass or organ, mild localized tenderness, scar kailee of cholecystectomy  Hernia: none     Rectal: Defered     Extremities: normal     Vascular: normal     Specific focus  Examination     Imp: acute appendicitis , hypertension , obesity, copd      Plan: Lap Appendectomy possible open now

## 2018-08-30 ENCOUNTER — OFFICE VISIT (OUTPATIENT)
Dept: SURGERY | Facility: CLINIC | Age: 58
End: 2018-08-30
Payer: MEDICAID

## 2018-08-30 VITALS
OXYGEN SATURATION: 94 % | BODY MASS INDEX: 45.82 KG/M2 | WEIGHT: 250.5 LBS | TEMPERATURE: 98 F | HEART RATE: 98 BPM | SYSTOLIC BLOOD PRESSURE: 114 MMHG | DIASTOLIC BLOOD PRESSURE: 78 MMHG

## 2018-08-30 DIAGNOSIS — Z72.0 TOBACCO ABUSE: ICD-10-CM

## 2018-08-30 DIAGNOSIS — J44.9 CHRONIC OBSTRUCTIVE PULMONARY DISEASE, UNSPECIFIED COPD TYPE: ICD-10-CM

## 2018-08-30 DIAGNOSIS — Z90.49 S/P LAPAROSCOPIC APPENDECTOMY: Primary | ICD-10-CM

## 2018-08-30 DIAGNOSIS — K35.30 ACUTE APPENDICITIS WITH LOCALIZED PERITONITIS: ICD-10-CM

## 2018-08-30 DIAGNOSIS — I10 HTN (HYPERTENSION), BENIGN: ICD-10-CM

## 2018-08-30 DIAGNOSIS — E66.01 MORBID OBESITY: ICD-10-CM

## 2018-08-30 PROCEDURE — 99213 OFFICE O/P EST LOW 20 MIN: CPT | Mod: PBBFAC,PN | Performed by: SURGERY

## 2018-08-30 PROCEDURE — 99204 OFFICE O/P NEW MOD 45 MIN: CPT | Mod: S$PBB,,, | Performed by: SURGERY

## 2018-08-30 PROCEDURE — 99999 PR PBB SHADOW E&M-EST. PATIENT-LVL III: CPT | Mod: PBBFAC,,, | Performed by: SURGERY

## 2018-08-30 RX ORDER — UMECLIDINIUM 62.5 UG/1
AEROSOL, POWDER ORAL
Refills: 5 | COMMUNITY
Start: 2018-08-15 | End: 2022-02-21 | Stop reason: ALTCHOICE

## 2018-08-30 NOTE — PROGRESS NOTES
Subjective:      Patient ID: Halima Pruitt is a 58 y.o. female.    Chief Complaint: Consult ( with Dr. Street)  Patient is status post laparoscopic appendectomy performed by Dr. Street 2 weeks ago at [facility].  She is unable to obtain transportation over to [facility] and her primary care provider referred her to us for evaluation.  She is passing flatus and bowel movement.  Diet is returning to normal. No nausea or vomiting.  Occasional soreness at the umbilical incision.  Ambulating well. She has kept her wounds covered with bandages.  No other complaints.    Past Medical History:   Diagnosis Date    Anxiety     COPD (chronic obstructive pulmonary disease)     Depression     HTN (hypertension), benign 6/1/2017    Seizures     last 34 yreas ago     Past Surgical History:   Procedure Laterality Date    CHOLECYSTECTOMY      HYSTERECTOMY       History reviewed. No pertinent family history.  Social History     Socioeconomic History    Marital status:      Spouse name: None    Number of children: None    Years of education: None    Highest education level: None   Social Needs    Financial resource strain: None    Food insecurity - worry: None    Food insecurity - inability: None    Transportation needs - medical: None    Transportation needs - non-medical: None   Occupational History    None   Tobacco Use    Smoking status: Current Every Day Smoker     Packs/day: 1.00     Years: 40.00     Pack years: 40.00     Types: Cigarettes    Smokeless tobacco: Never Used   Substance and Sexual Activity    Alcohol use: Yes     Comment: once in a while    Drug use: No    Sexual activity: None   Other Topics Concern    None   Social History Narrative    None       Current Outpatient Medications   Medication Sig Dispense Refill    ALBUTEROL SULFATE (VENTOLIN INHL) Inhale into the lungs.      aspirin 81 MG Chew Take 81 mg by mouth once daily.      cyclobenzaprine (FLEXERIL) 10 MG tablet Take 10 mg by mouth 3  (three) times daily as needed for Muscle spasms.      HYDROcodone-acetaminophen (NORCO) 5-325 mg per tablet Take 1 tablet by mouth every 6 (six) hours as needed for Pain. 24 tablet 0    ibuprofen (ADVIL,MOTRIN) 800 MG tablet Take 800 mg by mouth 3 (three) times daily.      INCRUSE ELLIPTA 62.5 mcg/actuation DsDv INHALE 1 PUFF(S) EVERY DAY BY INHALATION ROUTE.  5    lisinopril (PRINIVIL,ZESTRIL) 20 MG tablet Take 40 mg by mouth once daily.       omeprazole (PRILOSEC) 40 MG capsule Take 40 mg by mouth once daily.      oxybutynin (DITROPAN) 5 MG Tab Take 5 mg by mouth 3 (three) times daily.       sumatriptan (IMITREX) 25 MG Tab Take 50 mg by mouth every 2 (two) hours as needed.      topiramate (TOPAMAX) 50 MG tablet Take 50 mg by mouth 2 (two) times daily.      trazodone (DESYREL) 100 MG tablet Take 100 mg by mouth every evening.       No current facility-administered medications for this visit.      Review of patient's allergies indicates:   Allergen Reactions    Penicillins Hives    Sulfa (sulfonamide antibiotics) Hives       ROS:  All systems were reviewed and are negative, except that mentioned in the HPI.    Objective:     Vitals:    08/30/18 1328   BP: 114/78   Pulse: 98   Temp: 97.5 °F (36.4 °C)   SpO2: (!) 94%   Weight: 113.6 kg (250 lb 8 oz)     Physical Exam   Constitutional: She is oriented to person, place, and time. She appears well-developed and well-nourished. No distress.   HENT:   Head: Normocephalic and atraumatic.   Eyes: EOM are normal. Pupils are equal, round, and reactive to light. No scleral icterus.   Neck: Normal range of motion. No JVD present.   Cardiovascular: Normal rate and regular rhythm.   Pulmonary/Chest: Effort normal and breath sounds normal. No respiratory distress.   Abdominal: Soft. Bowel sounds are normal. She exhibits no distension. There is no rebound and no guarding.   Bandages removed, well healed incisions noted, exposed Monocryl sutures removed   Musculoskeletal:  Normal range of motion.   Neurological: She is alert and oriented to person, place, and time. No cranial nerve deficit.   Skin: Skin is warm and dry. She is not diaphoretic.   Psychiatric: She has a normal mood and affect.       Lab Review: CBC:   Lab Results   Component Value Date    WBC 17.96 (H) 08/11/2018    RBC 5.13 08/11/2018    HGB 15.1 08/11/2018    HCT 47.2 08/11/2018     08/11/2018     BMP:   Lab Results   Component Value Date    GLU 90 08/11/2018     08/11/2018    K 4.2 08/11/2018     08/11/2018    CO2 28 08/11/2018    BUN 6 (L) 08/11/2018    CREATININE 0.62 08/11/2018    CALCIUM 8.7 08/11/2018     Lab Results   Component Value Date    ALT 34 08/11/2018    AST 45 08/11/2018    ALKPHOS 113 08/11/2018    BILITOT 0.4 08/11/2018       FINAL PATHOLOGIC DIAGNOSIS  Appendix:  Fecalith;  Acute appendicitis and periappend  Assessment:     1. S/P laparoscopic appendectomy    2. Chronic obstructive pulmonary disease, unspecified COPD type    3. HTN (hypertension), benign    4. Acute appendicitis with localized peritonitis    5. Tobacco abuse    6. Morbid obesity      Plan:   58-year-old female 2 weeks status post lap appy with Dr. Street at [facility]  -patient unable to obtain transportation to [place]  -pt has healed well  -continue postop restrictions for another 4 weeks then gradually resume unrestricted activity  -f/u prn  -all questions answered

## 2018-11-26 PROBLEM — Z78.9 IMPAIRED MOBILITY AND ADLS: Status: ACTIVE | Noted: 2018-11-26

## 2018-11-26 PROBLEM — Z78.9 POOR TOLERANCE FOR ACTIVITY: Status: ACTIVE | Noted: 2018-11-26

## 2018-11-26 PROBLEM — Z74.09 IMPAIRED MOBILITY AND ADLS: Status: ACTIVE | Noted: 2018-11-26

## 2019-01-09 ENCOUNTER — OFFICE VISIT (OUTPATIENT)
Dept: UROLOGY | Facility: CLINIC | Age: 59
End: 2019-01-09
Payer: MEDICAID

## 2019-01-09 VITALS
WEIGHT: 251 LBS | SYSTOLIC BLOOD PRESSURE: 148 MMHG | HEIGHT: 62 IN | HEART RATE: 95 BPM | OXYGEN SATURATION: 97 % | BODY MASS INDEX: 46.19 KG/M2 | RESPIRATION RATE: 18 BRPM | DIASTOLIC BLOOD PRESSURE: 87 MMHG

## 2019-01-09 DIAGNOSIS — R35.1 NOCTURIA: ICD-10-CM

## 2019-01-09 DIAGNOSIS — N39.3 STRESS INCONTINENCE IN FEMALE: ICD-10-CM

## 2019-01-09 DIAGNOSIS — N39.3 SUI (STRESS URINARY INCONTINENCE, FEMALE): Primary | ICD-10-CM

## 2019-01-09 DIAGNOSIS — N39.3 STRESS INCONTINENCE: Primary | ICD-10-CM

## 2019-01-09 DIAGNOSIS — N39.41 URGE URINARY INCONTINENCE: ICD-10-CM

## 2019-01-09 PROCEDURE — 99203 PR OFFICE/OUTPT VISIT, NEW, LEVL III, 30-44 MIN: ICD-10-PCS | Mod: S$PBB,,, | Performed by: NURSE PRACTITIONER

## 2019-01-09 PROCEDURE — 99203 OFFICE O/P NEW LOW 30 MIN: CPT | Mod: S$PBB,,, | Performed by: NURSE PRACTITIONER

## 2019-01-09 PROCEDURE — 87088 URINE BACTERIA CULTURE: CPT

## 2019-01-09 PROCEDURE — 99215 OFFICE O/P EST HI 40 MIN: CPT | Mod: PBBFAC,PO | Performed by: NURSE PRACTITIONER

## 2019-01-09 PROCEDURE — 87077 CULTURE AEROBIC IDENTIFY: CPT

## 2019-01-09 PROCEDURE — 87186 SC STD MICRODIL/AGAR DIL: CPT

## 2019-01-09 PROCEDURE — 81003 URINALYSIS AUTO W/O SCOPE: CPT

## 2019-01-09 PROCEDURE — 87086 URINE CULTURE/COLONY COUNT: CPT

## 2019-01-09 PROCEDURE — 99999 PR PBB SHADOW E&M-EST. PATIENT-LVL V: ICD-10-PCS | Mod: PBBFAC,,, | Performed by: NURSE PRACTITIONER

## 2019-01-09 PROCEDURE — 99999 PR PBB SHADOW E&M-EST. PATIENT-LVL V: CPT | Mod: PBBFAC,,, | Performed by: NURSE PRACTITIONER

## 2019-01-09 RX ORDER — SODIUM CHLORIDE 9 MG/ML
INJECTION, SOLUTION INTRAVENOUS CONTINUOUS
Status: CANCELLED | OUTPATIENT
Start: 2019-01-09

## 2019-01-09 RX ORDER — LIDOCAINE HYDROCHLORIDE 20 MG/ML
JELLY TOPICAL ONCE
Status: CANCELLED | OUTPATIENT
Start: 2019-01-09 | End: 2019-01-09

## 2019-01-09 RX ORDER — CIPROFLOXACIN 2 MG/ML
400 INJECTION, SOLUTION INTRAVENOUS
Status: CANCELLED | OUTPATIENT
Start: 2019-01-09

## 2019-01-09 NOTE — PROGRESS NOTES
"Subjective:       Patient ID: Halima Pruitt is a 58 y.o. female.    Chief Complaint: Urinary Incontinence    Patient is new to me. She is here today for urinary incontinence. She reports she has urinary incontinence with sneezing, laughing, and coughing. It "gooches out". Patient reports she wear depends when she has to leave the house. She reports she has to change every 3 hours when at home ( she wears a pad at home only). Patient currently taking oxybutynin 5 mg for urinary urge incontinence and feels it's helping only in regards to that. Patient has had a hysterectomy. She has three children, all vaginal deliveries without complications. Onset: 3 years ago, but gradually worsening. Patient has tried kegel exercises, but does not find it helpful. Patient reports she would like something done because it's interfering with her ADLS and causing her to be emotionally down. She denies suicidal thoughts.       Review of Systems   Constitutional: Negative for appetite change, chills, fatigue and fever.   Gastrointestinal: Negative for abdominal pain, constipation, diarrhea, nausea and vomiting.   Genitourinary: Negative for decreased urine volume, difficulty urinating, dysuria, flank pain, frequency, hematuria, urgency, vaginal bleeding, vaginal discharge and vaginal pain.        Nocturia x2   Neurological: Negative for dizziness and headaches.   Psychiatric/Behavioral: Negative.        Objective:      Physical Exam   Constitutional: She is oriented to person, place, and time. She appears well-developed and well-nourished.   HENT:   Head: Normocephalic and atraumatic.   Eyes: EOM are normal. Pupils are equal, round, and reactive to light.   Neck: Normal range of motion.   Cardiovascular: Normal rate.   Pulmonary/Chest: Effort normal. No respiratory distress.   Abdominal: Soft. There is no tenderness.   Musculoskeletal: Normal range of motion. She exhibits no edema.   Lymphadenopathy:     She has no cervical " adenopathy.   Neurological: She is alert and oriented to person, place, and time. Coordination normal.   Skin: Skin is warm and dry.   Psychiatric: She has a normal mood and affect. Her behavior is normal. Judgment and thought content normal.   Nursing note and vitals reviewed.      Assessment:       1. QUITA (stress urinary incontinence, female)    2. Urge urinary incontinence    3. Nocturia        Plan:       Halima was seen today for urinary incontinence.    Diagnoses and all orders for this visit:    QUITA (stress urinary incontinence, female)  -     Urinalysis  -     Urine culture    Urge urinary incontinence  -     Urinalysis  -     Urine culture    Nocturia  -     Urinalysis  -     Urine culture    Other orders  1. Continue oxybutynin for urge incontinence.  2. Schedule UDS with Dr. Tompkins.    Follow-up after UDS.     Stacy Ellis NP

## 2019-01-09 NOTE — LETTER
January 9, 2019      Annette Troncoso NP  [address] [facility] [address]           [facility]  [address] [place] [address] [address]  [facility] [address]  Phone: [phone]  Fax: [phone]          Patient: Halima Pruitt   MR Number: [id number]   YOB: 1960   Date of Visit: 1/9/2019       Dear Annette Troncoso:    Thank you for referring Halima Pruitt to me for evaluation. Attached you will find relevant portions of my assessment and plan of care.    If you have questions, please do not hesitate to call me. I look forward to following Halima Pruitt along with you.    Sincerely,    Stacy Ellis NP    Enclosure  CC:  No Recipients    If you would like to receive this communication electronically, please contact [email or web address] or [phone] to request more information on [employer] Link access.    For providers and/or their staff who would like to refer a patient to [facility], please contact us through our one-stop-shop provider referral line, [facility] , at [phone].    If you feel you have received this communication in error or would no longer like to receive these types of communications, please e-mail [email or web address]

## 2019-01-09 NOTE — PATIENT INSTRUCTIONS
1. U/A  2. Urine cx  3. Continue oxybutynin for urge incontinence.  4. Schedule UDS and cystoscopy (for stress urinary incontinence) with Dr. Tompkins for Thursday, 1/17/19.  5. Follow-up after UDS.

## 2019-01-10 LAB
BILIRUB UR QL STRIP: NEGATIVE
CLARITY UR REFRACT.AUTO: CLEAR
COLOR UR AUTO: YELLOW
GLUCOSE UR QL STRIP: NEGATIVE
HGB UR QL STRIP: NEGATIVE
KETONES UR QL STRIP: NEGATIVE
LEUKOCYTE ESTERASE UR QL STRIP: NEGATIVE
NITRITE UR QL STRIP: NEGATIVE
PH UR STRIP: 6 [PH] (ref 5–8)
PROT UR QL STRIP: NEGATIVE
SP GR UR STRIP: 1.01 (ref 1–1.03)
URN SPEC COLLECT METH UR: NORMAL

## 2019-01-12 LAB — BACTERIA UR CULT: NORMAL

## 2019-01-14 DIAGNOSIS — N30.00 ACUTE CYSTITIS WITHOUT HEMATURIA: Primary | ICD-10-CM

## 2019-01-14 RX ORDER — CIPROFLOXACIN 500 MG/1
500 TABLET ORAL EVERY 12 HOURS
Qty: 14 TABLET | Refills: 0 | Status: SHIPPED | OUTPATIENT
Start: 2019-01-14 | End: 2019-01-21

## 2019-01-30 ENCOUNTER — OFFICE VISIT (OUTPATIENT)
Dept: UROLOGY | Facility: CLINIC | Age: 59
End: 2019-01-30
Payer: MEDICAID

## 2019-01-30 VITALS — WEIGHT: 250.69 LBS | HEIGHT: 62 IN | BODY MASS INDEX: 46.13 KG/M2

## 2019-01-30 DIAGNOSIS — R35.0 URINARY FREQUENCY: ICD-10-CM

## 2019-01-30 DIAGNOSIS — R35.1 NOCTURIA: ICD-10-CM

## 2019-01-30 DIAGNOSIS — N39.3 SUI (STRESS URINARY INCONTINENCE, FEMALE): Primary | ICD-10-CM

## 2019-01-30 DIAGNOSIS — E66.01 MORBID OBESITY: ICD-10-CM

## 2019-01-30 PROCEDURE — 99999 PR PBB SHADOW E&M-EST. PATIENT-LVL IV: ICD-10-PCS | Mod: PBBFAC,,, | Performed by: UROLOGY

## 2019-01-30 PROCEDURE — 99214 OFFICE O/P EST MOD 30 MIN: CPT | Mod: S$PBB,,, | Performed by: UROLOGY

## 2019-01-30 PROCEDURE — 99214 PR OFFICE/OUTPT VISIT, EST, LEVL IV, 30-39 MIN: ICD-10-PCS | Mod: S$PBB,,, | Performed by: UROLOGY

## 2019-01-30 PROCEDURE — 99999 PR PBB SHADOW E&M-EST. PATIENT-LVL IV: CPT | Mod: PBBFAC,,, | Performed by: UROLOGY

## 2019-01-30 PROCEDURE — 99214 OFFICE O/P EST MOD 30 MIN: CPT | Mod: PBBFAC,PO | Performed by: UROLOGY

## 2019-01-30 RX ORDER — CIPROFLOXACIN 2 MG/ML
400 INJECTION, SOLUTION INTRAVENOUS
Status: CANCELLED | OUTPATIENT
Start: 2019-01-30

## 2019-01-30 RX ORDER — SODIUM CHLORIDE 9 MG/ML
INJECTION, SOLUTION INTRAVENOUS CONTINUOUS
Status: CANCELLED | OUTPATIENT
Start: 2019-01-30

## 2019-01-30 RX ORDER — LIDOCAINE HYDROCHLORIDE 20 MG/ML
JELLY TOPICAL ONCE
Status: CANCELLED | OUTPATIENT
Start: 2019-01-30 | End: 2019-01-30

## 2019-01-30 NOTE — PROGRESS NOTES
Subjective:       Patient ID: Halima Pruitt is a 58 y.o. female.    Chief Complaint: Post-op Evaluation    59 yo WF with Type III QUITA x 2 years. Verified on VUDS. Cystocele grade I. Here to plan PV Sling.      Other   This is a chronic (QUITA) problem. The current episode started more than 1 year ago (~ 2 years with Severe QUITA. ( Saturating pads)). The problem occurs constantly. The problem has been gradually worsening. Associated symptoms include urinary symptoms. Pertinent negatives include no abdominal pain, anorexia, arthralgias, change in bowel habit, chest pain, chills, congestion, coughing, diaphoresis, fatigue, fever, headaches, joint swelling, myalgias, nausea, neck pain, numbness, rash, sore throat, swollen glands, vertigo, visual change, vomiting or weakness. She has tried nothing for the symptoms.     Review of Systems   Constitutional: Negative for activity change, appetite change, chills, diaphoresis, fatigue and fever.   HENT: Negative for congestion, ear pain, hearing loss, nosebleeds, sinus pressure, sore throat and trouble swallowing.    Eyes: Negative for pain and visual disturbance.   Respiratory: Negative for apnea, cough and shortness of breath.    Cardiovascular: Negative for chest pain and leg swelling.   Gastrointestinal: Negative for abdominal distention, abdominal pain, anal bleeding, anorexia, blood in stool, change in bowel habit, constipation, diarrhea, nausea, rectal pain and vomiting.   Endocrine: Negative for cold intolerance, heat intolerance, polydipsia, polyphagia and polyuria.   Genitourinary: Positive for dysuria, frequency and urgency. Negative for decreased urine volume, difficulty urinating, dyspareunia, enuresis, flank pain, genital sores, hematuria, menstrual problem, pelvic pain, vaginal bleeding, vaginal discharge and vaginal pain.   Musculoskeletal: Negative for arthralgias, back pain, joint swelling, myalgias and neck pain.   Skin: Negative for color change, pallor and  rash.   Allergic/Immunologic: Negative for environmental allergies, food allergies and immunocompromised state.   Neurological: Negative for dizziness, vertigo, speech difficulty, weakness, numbness and headaches.   Hematological: Negative for adenopathy. Does not bruise/bleed easily.   Psychiatric/Behavioral: Negative.        Objective:      Physical Exam   Nursing note and vitals reviewed.  Constitutional: She is oriented to person, place, and time. She appears well-developed and well-nourished.   HENT:   Head: Normocephalic.   Nose: Nose normal.   Mouth/Throat: Oropharynx is clear and moist.   Eyes: Conjunctivae and EOM are normal. Pupils are equal, round, and reactive to light.   Neck: Normal range of motion. Neck supple.   Cardiovascular: Normal rate, regular rhythm, normal heart sounds and intact distal pulses.    Pulmonary/Chest: Effort normal and breath sounds normal.   Abdominal: Soft. Bowel sounds are normal.   Genitourinary:   Genitourinary Comments: Grade I cystocele, Type III QUITA.   Musculoskeletal: Normal range of motion.   Neurological: She is alert and oriented to person, place, and time. She has normal reflexes.   Skin: Skin is warm and dry.     Psychiatric: She has a normal mood and affect. Her behavior is normal. Judgment and thought content normal.       Assessment:       1. QUITA (stress urinary incontinence, female)    2. Morbid obesity    3. Nocturia    4. Urinary frequency        Plan:       Patient Instructions   Schedule PV Sling 2/7/19 at [facility].   QUITA (stress urinary incontinence, female)  -     Case Request Operating Room: INSERTION, PUBOVAGINAL SLING, WITH CYSTOSCOPY  -     Vital Signs ; Standing  -     Notify physician ; Standing  -     Diet NPO; Standing  -     Basic metabolic panel; Future; Expected date: 01/30/2019  -     CBC auto differential; Future; Expected date: 01/30/2019  -     Urinalysis; Future; Expected date: 01/30/2019  -     Urine culture; Future; Expected date:  01/30/2019  -     Place in Outpatient; Standing  -     Insert peripheral IV; Standing    Morbid obesity  -     Case Request Operating Room: INSERTION, PUBOVAGINAL SLING, WITH CYSTOSCOPY  -     Vital Signs ; Standing  -     Notify physician ; Standing  -     Diet NPO; Standing  -     Basic metabolic panel; Future; Expected date: 01/30/2019  -     CBC auto differential; Future; Expected date: 01/30/2019  -     Urinalysis; Future; Expected date: 01/30/2019  -     Urine culture; Future; Expected date: 01/30/2019  -     Place in Outpatient; Standing  -     Insert peripheral IV; Standing    Nocturia  -     Case Request Operating Room: INSERTION, PUBOVAGINAL SLING, WITH CYSTOSCOPY  -     Vital Signs ; Standing  -     Notify physician ; Standing  -     Diet NPO; Standing  -     Basic metabolic panel; Future; Expected date: 01/30/2019  -     CBC auto differential; Future; Expected date: 01/30/2019  -     Urinalysis; Future; Expected date: 01/30/2019  -     Urine culture; Future; Expected date: 01/30/2019  -     Place in Outpatient; Standing  -     Insert peripheral IV; Standing    Urinary frequency  -     Case Request Operating Room: INSERTION, PUBOVAGINAL SLING, WITH CYSTOSCOPY  -     Vital Signs ; Standing  -     Notify physician ; Standing  -     Diet NPO; Standing  -     Basic metabolic panel; Future; Expected date: 01/30/2019  -     CBC auto differential; Future; Expected date: 01/30/2019  -     Urinalysis; Future; Expected date: 01/30/2019  -     Urine culture; Future; Expected date: 01/30/2019  -     Place in Outpatient; Standing  -     Insert peripheral IV; Standing    Other orders  -     0.9%  NaCl infusion  -     IP VTE LOW RISK PATIENT; Standing  -     Progressive Mobility Protocol (mobilize patient to their highest level of functioning at least twice daily); Standing  -     ciprofloxacin (CIPRO)400mg/200ml D5W IVPB 400 mg  -     lidocaine HCl 2% urojet

## 2019-02-27 ENCOUNTER — OFFICE VISIT (OUTPATIENT)
Dept: UROLOGY | Facility: CLINIC | Age: 59
End: 2019-02-27
Payer: MEDICAID

## 2019-02-27 VITALS — HEIGHT: 62 IN | WEIGHT: 250 LBS | BODY MASS INDEX: 46.01 KG/M2

## 2019-02-27 DIAGNOSIS — Z98.890 POST-OPERATIVE STATE: ICD-10-CM

## 2019-02-27 DIAGNOSIS — N39.3 SUI (STRESS URINARY INCONTINENCE, FEMALE): Primary | ICD-10-CM

## 2019-02-27 PROCEDURE — 99999 PR PBB SHADOW E&M-EST. PATIENT-LVL III: CPT | Mod: PBBFAC,,, | Performed by: UROLOGY

## 2019-02-27 PROCEDURE — 99024 PR POST-OP FOLLOW-UP VISIT: ICD-10-PCS | Mod: ,,, | Performed by: UROLOGY

## 2019-02-27 PROCEDURE — 99024 POSTOP FOLLOW-UP VISIT: CPT | Mod: ,,, | Performed by: UROLOGY

## 2019-02-27 PROCEDURE — 99213 OFFICE O/P EST LOW 20 MIN: CPT | Mod: PBBFAC,PO | Performed by: UROLOGY

## 2019-02-27 PROCEDURE — 99999 PR PBB SHADOW E&M-EST. PATIENT-LVL III: ICD-10-PCS | Mod: PBBFAC,,, | Performed by: UROLOGY

## 2019-02-27 RX ORDER — ESTRADIOL 0.1 MG/G
1 CREAM VAGINAL
Qty: 42.5 G | Refills: 2 | Status: SHIPPED | OUTPATIENT
Start: 2019-02-28 | End: 2023-03-20

## 2019-02-27 NOTE — PROGRESS NOTES
"Halima Pruitt is a 58 y.o. female patient.   1. QUITA (stress urinary incontinence, female)    2. Post-operative state      Past Medical History:   Diagnosis Date    Anxiety     COPD (chronic obstructive pulmonary disease)     Depression     HTN (hypertension), benign 6/1/2017    Seizures     last 34 yreas ago     Past Surgical History Pertinent Negatives:   Procedure Date Noted    TONSILLECTOMY 05/22/2015     Scheduled Meds:  Continuous Infusions:  PRN Meds:    Review of patient's allergies indicates:   Allergen Reactions    Penicillins Hives     Pt stated "anything that has 'cillin' in the name or ends in 'cillin'.    Sulfa (sulfonamide antibiotics) Hives     There are no hospital problems to display for this patient.    Height 5' 2" (1.575 m), weight 113.4 kg (250 lb).    Subjective:   Diet: Adequate intake.  Patient reports no nausea or vomiting.    Activity level: Returning to normal.    Pain control: Well controlled.      Objective:  Vital signs (most recent): Height 5' 2" (1.575 m), weight 113.4 kg (250 lb).  General appearance: Comfortable, well-appearing, in no acute distress and not in pain.    Lungs:  Normal respiratory rate and normal effort.  Breath sounds normal.    Heart: Normal rate.  Regular rhythm.  S1 normal.    Chest: Symmetric chest wall expansion.    Abdomen: Abdomen is soft.    Bowel sounds:  Bowel sounds are normal.    Tenderness: There is no abdominal tenderness tenderness.    Wound:  Clean.    Extremities: There is normal range of motion.    Neurological: The patient is alert and oriented to person, place and time.  Normal strength.  Pupils are equal, round, and reactive to light.       Assessment:   Post-op: 20 days.    Condition: In stable condition.     Plan:  Encourage ambulation (Kegal Exercises).  Regular diet.  Resume oral medications.      Patient Instructions   F/U 3 months         Nain Tompkins MD  2/27/2019  "

## 2019-05-01 ENCOUNTER — OFFICE VISIT (OUTPATIENT)
Dept: UROLOGY | Facility: CLINIC | Age: 59
End: 2019-05-01
Payer: MEDICAID

## 2019-05-01 VITALS — HEIGHT: 62 IN | BODY MASS INDEX: 46.01 KG/M2 | WEIGHT: 250 LBS

## 2019-05-01 DIAGNOSIS — Z98.890 POST-OPERATIVE STATE: ICD-10-CM

## 2019-05-01 DIAGNOSIS — R35.1 NOCTURIA: Primary | ICD-10-CM

## 2019-05-01 DIAGNOSIS — E66.01 MORBID OBESITY: ICD-10-CM

## 2019-05-01 DIAGNOSIS — N39.3 STRESS INCONTINENCE IN FEMALE: ICD-10-CM

## 2019-05-01 PROCEDURE — 99213 OFFICE O/P EST LOW 20 MIN: CPT | Mod: PBBFAC,PO | Performed by: UROLOGY

## 2019-05-01 PROCEDURE — 99024 POSTOP FOLLOW-UP VISIT: CPT | Mod: ,,, | Performed by: UROLOGY

## 2019-05-01 PROCEDURE — 99999 PR PBB SHADOW E&M-EST. PATIENT-LVL III: CPT | Mod: PBBFAC,,, | Performed by: UROLOGY

## 2019-05-01 PROCEDURE — 99999 PR PBB SHADOW E&M-EST. PATIENT-LVL III: ICD-10-PCS | Mod: PBBFAC,,, | Performed by: UROLOGY

## 2019-05-01 PROCEDURE — 99024 PR POST-OP FOLLOW-UP VISIT: ICD-10-PCS | Mod: ,,, | Performed by: UROLOGY

## 2019-05-01 NOTE — PROGRESS NOTES
"Halima Pruitt is a 58 y.o. female patient.   1. Nocturia    2. Morbid obesity    3. Stress incontinence in female    4. Post-operative state      Past Medical History:   Diagnosis Date    Anxiety     COPD (chronic obstructive pulmonary disease)     Depression     HTN (hypertension), benign 6/1/2017    Seizures     last 34 yreas ago     Past Surgical History Pertinent Negatives:   Procedure Date Noted    TONSILLECTOMY 05/22/2015     Scheduled Meds:  Continuous Infusions:  PRN Meds:    Review of patient's allergies indicates:   Allergen Reactions    Penicillins Hives     Pt stated "anything that has 'cillin' in the name or ends in 'cillin'.    Sulfa (sulfonamide antibiotics) Hives     There are no hospital problems to display for this patient.    Height 5' 2" (1.575 m), weight 113.4 kg (250 lb).    Subjective:   Diet: Adequate intake.  Patient reports no nausea or vomiting.    Activity level: Normal.    Pain control: Well controlled.      Objective:  Vital signs (most recent): Height 5' 2" (1.575 m), weight 113.4 kg (250 lb).  General appearance: Comfortable, well-appearing, in no acute distress and not in pain.    Lungs:  Normal respiratory rate and normal effort.  Breath sounds normal.    Heart: Normal rate.  Regular rhythm.  S1 normal.    Abdomen: Abdomen is soft.    Bowel sounds:  Bowel sounds are normal.    Tenderness: There is no abdominal tenderness tenderness.    Wound:  Clean.  There is no drainage.    Extremities: There is normal range of motion.    Neurological: The patient is alert and oriented to person, place and time.  Normal strength.  Pupils are equal, round, and reactive to light.       Assessment & Plan       Nain Tompkins MD  5/1/2019  "

## 2019-05-02 ENCOUNTER — TELEPHONE (OUTPATIENT)
Dept: ORTHOPEDICS | Facility: CLINIC | Age: 59
End: 2019-05-02

## 2019-05-02 ENCOUNTER — TELEPHONE (OUTPATIENT)
Dept: SPORTS MEDICINE | Facility: CLINIC | Age: 59
End: 2019-05-02

## 2019-05-02 NOTE — TELEPHONE ENCOUNTER
Called patient to get her set up with Dr Myers at the [place] office. Patient is scheduled to see us in 5/8/19. Patient verbalized understanding of appt.

## 2019-05-02 NOTE — TELEPHONE ENCOUNTER
Called and let patient know that Dr. Bryant does not see wrist and hand. She understood and gave permission for me to send her information to Dr. Myers's staff to schedule.

## 2019-05-08 ENCOUNTER — OFFICE VISIT (OUTPATIENT)
Dept: ORTHOPEDICS | Facility: CLINIC | Age: 59
End: 2019-05-08
Payer: MEDICAID

## 2019-05-08 VITALS — HEIGHT: 62 IN | WEIGHT: 250 LBS | BODY MASS INDEX: 46.01 KG/M2

## 2019-05-08 DIAGNOSIS — G56.21 CUBITAL TUNNEL SYNDROME ON RIGHT: Primary | ICD-10-CM

## 2019-05-08 PROCEDURE — 99999 PR PBB SHADOW E&M-EST. PATIENT-LVL III: CPT | Mod: PBBFAC,,, | Performed by: ORTHOPAEDIC SURGERY

## 2019-05-08 PROCEDURE — 99213 OFFICE O/P EST LOW 20 MIN: CPT | Mod: PBBFAC,PN | Performed by: ORTHOPAEDIC SURGERY

## 2019-05-08 PROCEDURE — 99999 PR PBB SHADOW E&M-EST. PATIENT-LVL III: ICD-10-PCS | Mod: PBBFAC,,, | Performed by: ORTHOPAEDIC SURGERY

## 2019-05-08 PROCEDURE — 99204 OFFICE O/P NEW MOD 45 MIN: CPT | Mod: S$PBB,,, | Performed by: ORTHOPAEDIC SURGERY

## 2019-05-08 PROCEDURE — 99204 PR OFFICE/OUTPT VISIT, NEW, LEVL IV, 45-59 MIN: ICD-10-PCS | Mod: S$PBB,,, | Performed by: ORTHOPAEDIC SURGERY

## 2019-05-08 NOTE — PROGRESS NOTES
[facility]  History & Physical  Orthopedics    SUBJECTIVE:      Chief Complaint:  Right wrist radial sided pain, right hand numbness and tingling    Referring Provider: Self, Aaareferral     History of Present Illness:  Patient is a 58 y.o. right hand dominant female who presents today with complaints of right radial wrist pain as well as numbness and tingling in the right small finger ring finger and long finger.  She reports the wrist pain has been present for approximately 2 months and that the numbness and tingling has been present for approximately 2 weeks.  She denies any other complaints today presents for initial evaluation. Of note she is status post bilateral carpal tunnel releases done remotely approximately 15 years ago by an unknown provider which she has done well from.  She reports that her pain at the radial aspect of her right wrist is 10/10 in intensity and significant and affects her activities of daily living.  She presents today for evaluation with no other complaints.    The patient is a/an part-time newspaper .    Onset of symptoms/DOI was 1-2 months for the right radial wrist pain in approximately 1-2 weeks for the numbness and tingling..    Symptoms are aggravated by activity and movement.    Symptoms are alleviated by rest.    Symptoms consist of pain and Numbness and tingling.    The patient rates their pain as a 10/10.    Attempted treatment(s) and/or interventions include rest.     The patient denies any fevers, chills, N/V, D/C and presents for evaluation.       Past Medical History:   Diagnosis Date    Anxiety     COPD (chronic obstructive pulmonary disease)     Depression     HTN (hypertension), benign 6/1/2017    Seizures     last 34 yreas ago     Past Surgical History:   Procedure Laterality Date    APPENDECTOMY, LAPAROSCOPIC N/A 8/11/2018    Performed by [address]re[facility] MD at [facility] OR    CHOLECYSTECTOMY      COLONOSCOPY N/A 6/23/2017     "Performed by Honey Flores [facility] at [facility]    CYSTOSCOPY, WITH URODYNAMIC TESTING N/A 1/17/2019    Performed by [facility]ain Tompkins [facility] at [facility] OR    HYSTERECTOMY      INCISION AND DRAINAGE (I&D) Left 6/1/2017    Performed by Roger Gale MD at [facility] OR    INSERTION, PUBOVAGINAL SLING, WITH CYSTOSCOPY N/A 2/7/2019    Performed by [facility]ain Tompkins [facility] at [facility] OR    pilonidal cystectomy       Review of patient's allergies indicates:   Allergen Reactions    Penicillins Hives     Pt stated "anything that has 'cillin' in the name or ends in 'cillin'.    Sulfa (sulfonamide antibiotics) Hives     Social History     Social History Narrative    Not on file     No family history on file.      Current Outpatient Medications:     acetaminophen (TYLENOL) 325 MG tablet, Take 1 tablet (325 mg total) by mouth every 6 (six) hours as needed for Pain., Disp: , Rfl:     ALBUTEROL SULFATE (VENTOLIN INHL), Inhale into the lungs., Disp: , Rfl:     aspirin 81 MG Chew, Take 81 mg by mouth once daily., Disp: , Rfl:     busPIRone (BUSPAR) 15 MG tablet, Take 15 mg by mouth 2 (two) times daily., Disp: , Rfl:     clotrimazole (LOTRIMIN) 1 % cream, Apply topically 2 (two) times daily. Apply to affected area twice daily after washing with soap and water, Disp: 113 g, Rfl: 11    cyclobenzaprine (FLEXERIL) 10 MG tablet, Take 10 mg by mouth 3 (three) times daily as needed for Muscle spasms., Disp: , Rfl:     estradiol (ESTRACE) 0.01 % (0.1 mg/gram) vaginal cream, Place 1 g vaginally twice a week., Disp: 42.5 g, Rfl: 2    HYDROcodone-acetaminophen (NORCO)  mg per tablet, Take 1 tablet by mouth every 6 (six) hours as needed for Pain., Disp: 20 tablet, Rfl: 0    ibuprofen (ADVIL,MOTRIN) 800 MG tablet, Take 800 mg by mouth every 6 (six) hours as needed. , Disp: , Rfl:     INCRUSE ELLIPTA 62.5 mcg/actuation DsDv, INHALE 1 PUFF(S) EVERY DAY BY INHALATION ROUTE., Disp: , Rfl: 5    lisinopril (PRINIVIL,ZESTRIL) 20 MG tablet, " "Take 40 mg by mouth once daily. , Disp: , Rfl:     omeprazole (PRILOSEC) 40 MG capsule, Take 40 mg by mouth daily as needed. , Disp: , Rfl:     oxybutynin (DITROPAN) 5 MG Tab, Take 5 mg by mouth 3 (three) times daily. , Disp: , Rfl:     sumatriptan (IMITREX) 25 MG Tab, Take 100 mg by mouth every 2 (two) hours as needed. , Disp: , Rfl:     tamsulosin (FLOMAX) 0.4 mg Cap, Take 1 capsule (0.4 mg total) by mouth every evening., Disp: 30 capsule, Rfl: 0    topiramate (TOPAMAX) 50 MG tablet, Take 50 mg by mouth every evening. , Disp: , Rfl:     trazodone (DESYREL) 100 MG tablet, Take 100 mg by mouth every evening., Disp: , Rfl:       Review of Systems:  Constitutional: no fever or chills  Eyes: no visual changes  ENT: no nasal congestion or sore throat  Respiratory: no cough or shortness of breath  Cardiovascular: no chest pain  Gastrointestinal: no nausea or vomiting, tolerating diet  Musculoskeletal: pain, soreness and numbness/tingling    OBJECTIVE:      Vital Signs (Most Recent):  Vitals:    05/08/19 1017   Weight: 113.4 kg (250 lb)   Height: 5' 2" (1.575 m)     Body mass index is 45.73 kg/m².      Physical Exam:  Constitutional: The patient appears well-developed and well-nourished. No distress.   Head: Normocephalic and atraumatic.   Nose: Nose normal.   Eyes: Conjunctivae and EOM are normal.   Neck: No tracheal deviation present.   Cardiovascular: Normal rate and intact distal pulses.    Pulmonary/Chest: Effort normal. No respiratory distress.   Abdominal: There is no guarding.   Neurological: The patient is alert.   Psychiatric: The patient has a normal mood and affect.     Right Hand/Wrist Examination:    Observation/Inspection:  Swelling  none    Deformity  none  Discoloration  none     Scars   bilateral extensile carpal tunnel    Atrophy  none    HAND/WRIST EXAMINATION:  Finkelstein's Test   positive  WHAT Test    positive  Snuff box tenderness   Neg  Broderick's Test    Neg  Hook of [place]ate " Tenderness  Neg  CMC grind    Neg  Circumduction test   Neg    Neurovascular Exam:  Digits WWP, brisk CR < 3s throughout  NVI motor/LTS to M/R/U nerves, radial pulse 2+  Tinel's Test - Carpal Tunnel  Neg  Tinel's Test - Cubital Tunnel  positive  Phalen's Test    Neg  Median Nerve Compression Test Neg    ROM hand/wrist/elbow full, painless    Diagnostic Results:     Xray -  x-rays of the patient's right hand demonstrate no acute fractures or dislocations or significant degenerative changes  EMG - none    ASSESSMENT/PLAN:      58 y.o. yo female with right de Quervain tenosynovitis, right cubital tunnel syndrome  Plan:  At this time I held an extensive discussion of the patient regarding treatment options. For her de Quervain tenosynovitis we discussed NSAIDs occupational therapy steroid injections thumb spica bracing and surgery. She would like to attempt a trial of thumb spica bracing and NSAID medications which I feel is reasonable.  I discussed that her numbness and tingling is concerning for cubital tunnel syndrome.  I would like to further evaluate this with an EMG nerve conduction study. She will follow up after her EMG for re-evaluation          Braden Myers M.D.

## 2019-07-24 ENCOUNTER — TELEPHONE (OUTPATIENT)
Dept: GASTROENTEROLOGY | Facility: CLINIC | Age: 59
End: 2019-07-24

## 2019-08-07 ENCOUNTER — PROCEDURE VISIT (OUTPATIENT)
Dept: NEUROLOGY | Facility: CLINIC | Age: 59
End: 2019-08-07
Payer: MEDICAID

## 2019-08-07 DIAGNOSIS — G56.21 CUBITAL TUNNEL SYNDROME ON RIGHT: ICD-10-CM

## 2019-08-07 PROCEDURE — 95912 PR NERVE CONDUCTION STUDY; 11 -12 STUDIES: ICD-10-PCS | Mod: 26,S$PBB,, | Performed by: PSYCHIATRY & NEUROLOGY

## 2019-08-07 PROCEDURE — 95886 MUSC TEST DONE W/N TEST COMP: CPT | Mod: 26,S$PBB,, | Performed by: PSYCHIATRY & NEUROLOGY

## 2019-08-07 PROCEDURE — 95886 MUSC TEST DONE W/N TEST COMP: CPT | Mod: PBBFAC | Performed by: PSYCHIATRY & NEUROLOGY

## 2019-08-07 PROCEDURE — 95912 NRV CNDJ TEST 11-12 STUDIES: CPT | Mod: 26,S$PBB,, | Performed by: PSYCHIATRY & NEUROLOGY

## 2019-08-07 PROCEDURE — 95913 NRV CNDJ TEST 13/> STUDIES: CPT | Mod: PBBFAC | Performed by: PSYCHIATRY & NEUROLOGY

## 2019-08-07 PROCEDURE — 95912 NRV CNDJ TEST 11-12 STUDIES: CPT | Mod: PBBFAC | Performed by: PSYCHIATRY & NEUROLOGY

## 2019-08-07 PROCEDURE — 95886 PR EMG COMPLETE, W/ NERVE CONDUCTION STUDIES, 5+ MUSCLES: ICD-10-PCS | Mod: 26,S$PBB,, | Performed by: PSYCHIATRY & NEUROLOGY

## 2019-08-13 ENCOUNTER — TELEPHONE (OUTPATIENT)
Dept: GASTROENTEROLOGY | Facility: CLINIC | Age: 59
End: 2019-08-13

## 2019-08-13 DIAGNOSIS — Z86.010 PERSONAL HISTORY OF COLONIC POLYPS: Primary | ICD-10-CM

## 2019-08-13 RX ORDER — POLYETHYLENE GLYCOL 3350, SODIUM SULFATE ANHYDROUS, SODIUM BICARBONATE, SODIUM CHLORIDE, POTASSIUM CHLORIDE 236; 22.74; 6.74; 5.86; 2.97 G/4L; G/4L; G/4L; G/4L; G/4L
POWDER, FOR SOLUTION ORAL
Qty: 1 BOTTLE | Refills: 0 | Status: SHIPPED | OUTPATIENT
Start: 2019-08-13 | End: 2022-02-08

## 2019-08-13 NOTE — TELEPHONE ENCOUNTER
Spoke with patient and she states that she rec'd a text stating her procedure is scheduled on 8/20 but her chart shows 8/30. I explained to her that I don't show where anything is scheduled on 8/20 for her.

## 2019-08-13 NOTE — TELEPHONE ENCOUNTER
GOLYTELY/ COLYTE/ NULYTELY Instructions    You are scheduled for a colonoscopy with Dr. Carvalho on Friday, August 30 at [facility] located at [address] [place] [address] in [place].  Enter through the [facility] Entrance.  Check-in at Same Day Surgery.      You will receive a call 1-2 days before your colonoscopy to tell you the time to arrive.  If you have not received a call by the day before your procedure, call the Endoscopy Lab at [phone].    To ensure that your test is accurate and complete, you MUST follow these instructions listed below.  If you have any questions, please call our office at [phone].  Plan on being at the hospital for your procedure for 3-4 hours.    1.  Follow a CLEAR LIQUID DIET for the entire day before your scheduled colonoscopy.  This means no solid food the entire day starting when you wake.  You may have as much of the clear liquids as you want throughout the day.   CLEAR LIQUID DIET:   - Avoid Red, Orange, Purple, and/or Blue food coloring   - NO DAIRY   - You can have:  Coffee with sugar (no creamer), tea, water, soda, apple or white grape juice, chicken or beef broth/bouillon (no meat, noodles, or veggies), green/yellow popsicles, green/yellow Jell-O, lemonade.    2.  MIX GOLYTELY/COLYTE/NULYTELY (all names for same product) WITH ONE (1) GALLON OF WATER.  YOU MAY ADD A FLAVOR PACKET OR YELLOW/GREEN POWDER DRINK MIX TO THIS.  PUT IN REFRIGERATOR.  This is easier to drink if this solution is cold, so you can mix the solution one day ahead of time and place in the refrigerator prior to drinking.  You have to drink the solution within 24-36 hours of mixing it.  Do NOT put this solution over ice.  It IS ok to drink with a straw.    3. AT 5 PM THE DAY BEFORE YOUR COLONOSCOPY, DRINK ONE (1) 8 OUNCE GLASS OF MIXTURE EVERY 10 MINUTES UNTIL HALF OF THE GALLON IS CONSUMED.  Keep this mixture cold and in refrigerator as much as you can while drinking it.  Place the remaining  half of mixture in the refrigerator when you finish the first half.    4.  The endoscopy department will call you 2 days before your colonoscopy to tell you the exact time to arrive, AND to tell you the exact time to drink the 2nd portion of your prep (which will be FIVE HOURS BEFORE YOUR ARRIVAL TIME).  At this time given to you, DRINK ONE (1) 8 OUNCE GLASS OF MIXTURE EVERY 10 MINUTES UNTIL THE OTHER HALF IS CONSUMED. Keep the mixture cold while you are drinking it. Once this is complete, you may not have ANYTHING else by mouth!      5.  You must have someone with you to DRIVE YOU HOME since you will be receiving IV sedation for the colonoscopy.    6.  It is ok to take your heart, blood pressure, and seizure medications in the morning of your test with a SIP of water.  Hold other medications until after your procedure.  Do NOT have anything else to eat or drink the morning of your colonoscopy.  It is ok to brush your teeth.    7.  If you are on blood thinners THAT YOU HAVE BEEN INSTRUCTED TO HOLD BY YOUR DOCTOR FOR THIS PROCEDURE, then do NOT take this the morning of your colonoscopy.  Do NOT stop these medications on your own, they must be approved to be held by your doctor.  Your colonoscopy can NOT be done if you are on these medications.  Examples of blood thinners include: Coumadin, Aggrenox, Plavix, Pradaxa, Reapro, Pletal, Xarelto, Ticagrelor, Brilinta, Eliquis, and high dose aspirin (325 mg).  You do not have to stop baby aspirin 81 mg.    8.  IF YOU ARE DIABETIC:  NO INSULIN OR ORAL MEDICATIONS THE MORNING OF THE COLONOSCOPY.  TAKE ONLY HALF THE DOSE OF YOUR INSULIN THE DAY BEFORE THE COLONOSCOPY.  DO NOT TAKE ANY ORAL DIABETIC MEDICATIONS THE DAY BEFORE THE COLONOSCOPY.  IF YOU ARE AN INSULIN DEPENDENT DIABETIC WITH UNSTABLE BLOOD SUGARS, NOTIFY YOUR PRIMARY CARE PHYSICIAN FOR INSTRUCTIONS.

## 2019-08-13 NOTE — TELEPHONE ENCOUNTER
----- Message from Selena Christina sent at 8/13/2019  3:39 PM CDT -----  Contact: [phone]/mwjc  Patient is requesting a call back regarding confirming the date of her colonoscopy. She got a text saying 08/20. But she can not do it that date. Her chart says 08/30. Please call her to confirm. Thanks

## 2019-08-13 NOTE — TELEPHONE ENCOUNTER
Pt is in clinic today with her sister (who is the pt for today) and states that she needs a screening colonoscopy.  She denies FH of colon cancer, heart issues, lung or kidney issues.  She had colonoscopy 6/2017 with polyps and was told to repeat in 1 year due to prep.    Golytely sent, and she also needs to do Miralax bid for the 7 days prior to colonoscopy.  Prep instructions given.

## 2020-05-21 ENCOUNTER — TELEPHONE (OUTPATIENT)
Dept: GASTROENTEROLOGY | Facility: CLINIC | Age: 60
End: 2020-05-21

## 2020-12-03 ENCOUNTER — TELEPHONE (OUTPATIENT)
Dept: GASTROENTEROLOGY | Facility: CLINIC | Age: 60
End: 2020-12-03

## 2020-12-03 NOTE — LETTER
December 3, 2020    Halima Pruitt  [address] [facility] [address]             [facility] - Gastro - [address]   [address], [facility]   [facility] [address]  Phone: [phone]  Fax: [phone] Dear Ms. Pruitt:    We have contacted you to schedule a screening colonoscopy that was ordered by your doctor. Please contact the office to schedule at [phone].      If you have any questions or concerns, please don't hesitate to call.    Sincerely,        Ninoska Carvalho MD

## 2020-12-03 NOTE — TELEPHONE ENCOUNTER
Spoke to patient about scheduling a screening colonoscopy. Patient states she has get her insurance straightened out first before she can do anything. Patient is getting bills in the mail that she shouldn't be getting.

## 2021-06-18 ENCOUNTER — TELEPHONE (OUTPATIENT)
Dept: GASTROENTEROLOGY | Facility: CLINIC | Age: 61
End: 2021-06-18

## 2021-12-08 PROBLEM — M06.9 RHEUMATOID ARTHRITIS: Status: ACTIVE | Noted: 2020-06-04

## 2021-12-08 PROBLEM — J96.01 ACUTE HYPOXEMIC RESPIRATORY FAILURE: Status: ACTIVE | Noted: 2021-12-08

## 2021-12-08 PROBLEM — J18.9 BILATERAL PNEUMONIA: Status: ACTIVE | Noted: 2021-12-08

## 2021-12-08 PROBLEM — I10 ESSENTIAL HYPERTENSION: Status: ACTIVE | Noted: 2017-08-02

## 2021-12-08 PROBLEM — R73.03 PREDIABETES: Status: ACTIVE | Noted: 2019-10-15

## 2021-12-13 PROBLEM — J96.01 ACUTE HYPOXEMIC RESPIRATORY FAILURE: Status: RESOLVED | Noted: 2021-12-08 | Resolved: 2021-12-13

## 2022-02-03 ENCOUNTER — TELEPHONE (OUTPATIENT)
Dept: GASTROENTEROLOGY | Facility: CLINIC | Age: 62
End: 2022-02-03
Payer: MEDICAID

## 2022-02-08 ENCOUNTER — OFFICE VISIT (OUTPATIENT)
Dept: GASTROENTEROLOGY | Facility: CLINIC | Age: 62
End: 2022-02-08
Payer: MEDICAID

## 2022-02-08 VITALS
HEIGHT: 63 IN | WEIGHT: 255.81 LBS | SYSTOLIC BLOOD PRESSURE: 150 MMHG | HEART RATE: 85 BPM | BODY MASS INDEX: 45.32 KG/M2 | DIASTOLIC BLOOD PRESSURE: 87 MMHG

## 2022-02-08 DIAGNOSIS — Z01.818 PREOPERATIVE EXAMINATION: ICD-10-CM

## 2022-02-08 DIAGNOSIS — E66.01 CLASS 3 SEVERE OBESITY DUE TO EXCESS CALORIES WITH SERIOUS COMORBIDITY AND BODY MASS INDEX (BMI) OF 45.0 TO 49.9 IN ADULT: ICD-10-CM

## 2022-02-08 DIAGNOSIS — Z12.11 ENCOUNTER FOR COLONOSCOPY DUE TO HISTORY OF ADENOMATOUS COLONIC POLYPS: Primary | ICD-10-CM

## 2022-02-08 DIAGNOSIS — Z86.010 ENCOUNTER FOR COLONOSCOPY DUE TO HISTORY OF ADENOMATOUS COLONIC POLYPS: Primary | ICD-10-CM

## 2022-02-08 DIAGNOSIS — K59.04 CHRONIC IDIOPATHIC CONSTIPATION: ICD-10-CM

## 2022-02-08 PROBLEM — M51.16 RADICULOPATHY DUE TO LUMBAR INTERVERTEBRAL DISC DISORDER: Status: ACTIVE | Noted: 2019-06-10

## 2022-02-08 PROBLEM — Z86.0101 ENCOUNTER FOR COLONOSCOPY DUE TO HISTORY OF ADENOMATOUS COLONIC POLYPS: Status: ACTIVE | Noted: 2022-02-08

## 2022-02-08 PROBLEM — J18.9 BILATERAL PNEUMONIA: Status: RESOLVED | Noted: 2021-12-08 | Resolved: 2022-02-08

## 2022-02-08 PROCEDURE — 99999 PR PBB SHADOW E&M-EST. PATIENT-LVL V: ICD-10-PCS | Mod: PBBFAC,,, | Performed by: NURSE PRACTITIONER

## 2022-02-08 PROCEDURE — 3079F DIAST BP 80-89 MM HG: CPT | Mod: CPTII,,, | Performed by: NURSE PRACTITIONER

## 2022-02-08 PROCEDURE — 99999 PR PBB SHADOW E&M-EST. PATIENT-LVL V: CPT | Mod: PBBFAC,,, | Performed by: NURSE PRACTITIONER

## 2022-02-08 PROCEDURE — 1160F PR REVIEW ALL MEDS BY PRESCRIBER/CLIN PHARMACIST DOCUMENTED: ICD-10-PCS | Mod: CPTII,,, | Performed by: NURSE PRACTITIONER

## 2022-02-08 PROCEDURE — 1159F MED LIST DOCD IN RCRD: CPT | Mod: CPTII,,, | Performed by: NURSE PRACTITIONER

## 2022-02-08 PROCEDURE — 3079F PR MOST RECENT DIASTOLIC BLOOD PRESSURE 80-89 MM HG: ICD-10-PCS | Mod: CPTII,,, | Performed by: NURSE PRACTITIONER

## 2022-02-08 PROCEDURE — 99215 OFFICE O/P EST HI 40 MIN: CPT | Mod: PBBFAC,PO | Performed by: NURSE PRACTITIONER

## 2022-02-08 PROCEDURE — 99203 PR OFFICE/OUTPT VISIT, NEW, LEVL III, 30-44 MIN: ICD-10-PCS | Mod: S$PBB,,, | Performed by: NURSE PRACTITIONER

## 2022-02-08 PROCEDURE — 3077F SYST BP >= 140 MM HG: CPT | Mod: CPTII,,, | Performed by: NURSE PRACTITIONER

## 2022-02-08 PROCEDURE — 3077F PR MOST RECENT SYSTOLIC BLOOD PRESSURE >= 140 MM HG: ICD-10-PCS | Mod: CPTII,,, | Performed by: NURSE PRACTITIONER

## 2022-02-08 PROCEDURE — 99203 OFFICE O/P NEW LOW 30 MIN: CPT | Mod: S$PBB,,, | Performed by: NURSE PRACTITIONER

## 2022-02-08 PROCEDURE — 1159F PR MEDICATION LIST DOCUMENTED IN MEDICAL RECORD: ICD-10-PCS | Mod: CPTII,,, | Performed by: NURSE PRACTITIONER

## 2022-02-08 PROCEDURE — 4010F ACE/ARB THERAPY RXD/TAKEN: CPT | Mod: CPTII,,, | Performed by: NURSE PRACTITIONER

## 2022-02-08 PROCEDURE — 4010F PR ACE/ARB THEARPY RXD/TAKEN: ICD-10-PCS | Mod: CPTII,,, | Performed by: NURSE PRACTITIONER

## 2022-02-08 PROCEDURE — 1160F RVW MEDS BY RX/DR IN RCRD: CPT | Mod: CPTII,,, | Performed by: NURSE PRACTITIONER

## 2022-02-08 PROCEDURE — 3008F PR BODY MASS INDEX (BMI) DOCUMENTED: ICD-10-PCS | Mod: CPTII,,, | Performed by: NURSE PRACTITIONER

## 2022-02-08 PROCEDURE — 3008F BODY MASS INDEX DOCD: CPT | Mod: CPTII,,, | Performed by: NURSE PRACTITIONER

## 2022-02-08 RX ORDER — POLYETHYLENE GLYCOL 3350 17 G/17G
17 POWDER, FOR SOLUTION ORAL DAILY
Qty: 510 G | Refills: 11 | Status: SHIPPED | OUTPATIENT
Start: 2022-02-08

## 2022-02-08 RX ORDER — SODIUM, POTASSIUM,MAG SULFATES 17.5-3.13G
1 SOLUTION, RECONSTITUTED, ORAL ORAL DAILY
Qty: 1 KIT | Refills: 0 | Status: SHIPPED | OUTPATIENT
Start: 2022-02-08 | End: 2022-02-11

## 2022-02-08 NOTE — PATIENT INSTRUCTIONS
SUPREP Instructions    You are scheduled for a colonoscopy with Dr. Carvalho on 2/18/2022 at [facility] in [place], [place].  To ensure that your test is accurate and complete, you MUST follow these instructions listed below.  If you have any questions, please call our office at [phone].  Plan on being at the hospital for your procedure for 3-4 hours.    2 DAYS BEFORE YOUR COLONOSCOPY: Wednesday, 2/16/2022   1.  Eat a normal breakfast and lunch, but after lunch you will start your CLEAR LIQUID DIET.  FOR SUPPER/DINNER YOU WILL HAVE CLEAR LIQUIDS.    CLEAR LIQUID DIET:   - Avoid Red, Orange, Purple, and/or Blue food coloring   - NO DAIRY   - You can have:  Coffee with sugar (no creamer), tea, water, soda, apple or white grape juice, chicken or beef broth/bouillon (no meat, noodles, or veggies), green/yellow popsicles, green/yellow Jell-O, lemonade.     2.  At 4pm, drink ONE ENTIRE BOTTLE OF MAGNESIUM CITRATE     1 DAY BEFORE YOUR COLONOSCOPY: Thursday, 2/17/2022  1.  Follow a CLEAR LIQUID DIET for the entire day before your scheduled colonoscopy.  This means no solid food the entire day starting when you wake.  You may have as much of the clear liquids as you want throughout the day.   CLEAR LIQUID DIET:   - Avoid Red, Orange, Purple, and/or Blue food coloring   - NO DAIRY   - You can have:  Coffee with sugar (no creamer), tea, water, soda, apple or white grape juice, chicken or beef broth/bouillon (no meat, noodles, or veggies), green/yellow popsicles, green/yellow Jell-O, lemonade.    2.  AT 5 pm the evening before your colonoscopy, POUR ONE (1) BOTTLE OF SUPREP INTO THE MIXING CONTAINER, PROVIDED INSIDE THE BOX.  ADD WATER TO THE LINE ON THE CONTAINER AND MIX IT WELL.  DRINK THE ENTIRE CONTAINER AND THEN DRINK TWO (2) MORE CONTAINERS OF WATER OVER THE NEXT 1 HOUR.  This is sometimes easier to drink if this solution is cold, so you can mix the solution 20 minutes ahead of time and place in the refrigerator  prior to drinking.  You have to drink the solution within 30-45 minutes of mixing it.  Do NOT put this solution over ice.  It IS ok to drink with a straw.    3.  The endoscopy department will call you 1 day before your colonoscopy to tell you the exact time to arrive, AND to tell you the exact time to drink the 2nd portion of your prep (which will be FIVE HOURS BEFORE YOUR ARRIVAL TIME).  At this time given to you, POUR ONE (1) BOTTLE OF SUPREP INTO THE MIXING CONTAINER, PROVIDED INSIDE THE BOX.  ADD WATER TO THE LINE ON THE CONTAINER AND MIX IT WELL.  DRINK THE ENTIRE CONTAINER AND THEN DRINK TWO (2) MORE CONTAINERS OF WATER OVER THE NEXT 1 HOUR.  This is sometimes easier to drink if this solution is cold, so you can mix the solution 20 minutes ahead of time and place in the refrigerator prior to drinking.  You have to drink the solution within 30-45 minutes of mixing it.  Do NOT put this solution over ice.  It IS ok to drink with a straw.  Once this is complete, you may not have ANYTHING else by mouth!    4.  You must have someone with you to DRIVE YOU HOME since you will be receiving IV sedation for the colonoscopy.    5.  It is ok to take MOST of your REGULAR MEDICATIONS  in the morning of your test with a SIP of water.  THE ONLY MEDS YOU NEED TO HOLD ARE YOUR DIABETES MEDICATIONS,  SOME BLOOD PRESSURE MEDS, AND BLOOD THINNERS IF OK'D BY YOUR DOCTOR.  Do NOT have anything else to eat or drink the morning of your colonoscopy.  It is ok to brush your teeth.    6.  If you are on blood thinners THAT YOU HAVE BEEN INSTRUCTED TO HOLD BY YOUR DOCTOR FOR THIS PROCEDURE, then do NOT take this the morning of your colonoscopy.  Do NOT stop these medications on your own, they must be approved to be held by your doctor.  Your colonoscopy can NOT be done if you are on these medications.  Examples of blood thinners include: Coumadin, Aggrenox, Plavix, Pradaxa, Reapro, Pletal, Xarelto, Ticagrelor, Brilinta, Eliquis, and high  dose aspirin (325 mg).  You do not have to stop baby aspirin 81 mg.    7.  IF YOU ARE DIABETIC:  NO INSULIN OR ORAL MEDICATIONS THE MORNING OF THE COLONOSCOPY.  TAKE ONLY HALF THE DOSE OF YOUR INSULIN THE DAY BEFORE THE COLONOSCOPY.  DO NOT TAKE ANY ORAL DIABETIC MEDICATIONS THE DAY BEFORE THE COLONOSCOPY.  IF YOU ARE AN INSULIN DEPENDENT DIABETIC WITH UNSTABLE BLOOD SUGARS, NOTIFY YOUR PRIMARY CARE PHYSICIAN FOR INSTRUCTIONS.

## 2022-02-08 NOTE — PROGRESS NOTES
Subjective:       Patient ID: Halima Pruitt is a 61 y.o. female.    Chief Complaint: Follow-up (Colonoscopy )    62 y/o female with hypertension, COPD, constipation, and hx of colon polyps presents to clinic to schedule colonoscopy. Repeat colonoscopy several years overdue. Colonoscopy in 2017 with recommendations to repeat in 1 year for surveillance. Denies abdominal pain, weight loss, fatigue, hematochezia or melena. Occasional constipation-takes mag citrate prn. FH includes sister with colon cancer.     Old records from chart reviewed and summarized, significant for:  - 6/23/2017 colonoscopy revealed one 12 mm polyp in the descending colon, removed using injection-lift and a hot snare. Resected and retrieved. One 8 mm polyp in the sigmoid colon, removed with a cold snare. Resected and retrieved. Diverticulosis in the sigmoid colon and in the descending colon.    Current labs requested from referring provider at [facility] in [place], [place].         Past Medical History:   Diagnosis Date    Anxiety     Bilateral pneumonia 12/8/2021    COPD (chronic obstructive pulmonary disease)     Depression     HTN (hypertension), benign 6/1/2017    Seizures     last 34 yreas ago       Past Surgical History:   Procedure Laterality Date    CHOLECYSTECTOMY      COLONOSCOPY N/A 6/23/2017    Procedure: COLONOSCOPY;  Surgeon: Honey Flores MD;  Location: [facility] ENDO;  Service: Endoscopy;  Laterality: N/A;    HYSTERECTOMY      LAPAROSCOPIC APPENDECTOMY N/A 8/11/2018    Procedure: APPENDECTOMY, LAPAROSCOPIC;  Surgeon: Contreras Street MD;  Location: [facility] OR;  Service: General;  Laterality: N/A;    pilonidal cystectomy         History reviewed. No pertinent family history.    Social History     Socioeconomic History    Marital status:    Tobacco Use    Smoking status: Current Every Day Smoker     Packs/day: 0.25     Years: 40.00     Pack years: 10.00     Types: Cigarettes    Smokeless tobacco: Never  "Used   Substance and Sexual Activity    Alcohol use: Yes     Comment: once in a while    Drug use: No       Review of Systems   Constitutional: Negative for appetite change and unexpected weight change.   HENT: Negative for trouble swallowing.    Respiratory: Negative for shortness of breath.    Cardiovascular: Negative for chest pain.   Gastrointestinal: Positive for constipation. Negative for abdominal pain.   Hematological: Negative for adenopathy.   Psychiatric/Behavioral: Negative for dysphoric mood.         Objective:     Vitals:    02/08/22 1532   BP: (!) 150/87   Pulse: 85   Weight: 116 kg (255 lb 12.8 oz)   Height: 5' 3" (1.6 m)          Physical Exam  Constitutional:       General: She is not in acute distress.     Appearance: She is obese. She is not ill-appearing.   HENT:      Head: Normocephalic.   Eyes:      Conjunctiva/sclera: Conjunctivae normal.      Pupils: Pupils are equal, round, and reactive to light.   Cardiovascular:      Rate and Rhythm: Normal rate and regular rhythm.   Pulmonary:      Effort: Pulmonary effort is normal. No respiratory distress.      Breath sounds: Normal breath sounds.   Musculoskeletal:         General: No swelling.      Cervical back: Normal range of motion.   Skin:     General: Skin is warm and dry.   Neurological:      Mental Status: She is alert and oriented to person, place, and time.   Psychiatric:         Mood and Affect: Mood normal.         Behavior: Behavior normal.               Assessment:         ICD-10-CM ICD-9-CM   1. Encounter for colonoscopy due to history of adenomatous colonic polyps  Z12.11 V76.51    Z86.010 V12.72   2. Preoperative examination  Z01.818 V72.84   3. Chronic idiopathic constipation  K59.04 564.00   4. Class 3 severe obesity due to excess calories with serious comorbidity and body mass index (BMI) of 45.0 to 49.9 in adult  E66.01 278.01    Z68.42 V85.42       Plan:       Encounter for colonoscopy due to history of adenomatous colonic " polyps  -     Schedule colonoscopy 2/18/2022; 2 day bowel prep  -     SUPREP BOWEL PREP KIT 17.5-3.13-1.6 gram SolR; Take 177 mLs by mouth once daily. for 2 days  Dispense: 1 kit; Refill: 0    Preoperative examination  -     COVID-19 Routine Screening; Future; Expected date: 02/08/2022    Chronic idiopathic constipation  -     polyethylene glycol (GLYCOLAX) 17 gram/dose powder; Take 17 g by mouth once daily.  Dispense: 510 g; Refill: 11    Class 3 severe obesity due to excess calories with serious comorbidity and body mass index (BMI) of 45.0 to 49.9 in adult        -     Weight loss advised. Dietary and exercise         counseling done.        Follow up if symptoms worsen or fail to improve.     Patient's Medications   New Prescriptions    POLYETHYLENE GLYCOL (GLYCOLAX) 17 GRAM/DOSE POWDER    Take 17 g by mouth once daily.    SUPREP BOWEL PREP KIT 17.5-3.13-1.6 GRAM SOLR    Take 177 mLs by mouth once daily. for 2 days   Previous Medications    ACETAMINOPHEN (TYLENOL) 325 MG TABLET    Take 1 tablet (325 mg total) by mouth every 6 (six) hours as needed for Pain.    ALBUTEROL (PROVENTIL/VENTOLIN HFA) 90 MCG/ACTUATION INHALER    Inhale 2 puffs by mouth every 4 hours by inhalation route as needed.    ALBUTEROL SULFATE (VENTOLIN INHL)    Inhale into the lungs.    ALBUTEROL-IPRATROPIUM (DUO-NEB) 2.5 MG-0.5 MG/3 ML NEBULIZER SOLUTION    Take 3 mLs by nebulization every 6 (six) hours as needed for Wheezing or Shortness of Breath. Rescue    AMMONIUM LACTATE 12 % CREA    Apply 1 application twice a day by topical route.    ASPIRIN 81 MG CHEW    Take 81 mg by mouth once daily.    BUSPIRONE (BUSPAR) 30 MG TAB    Take 1 tablet(s) twice a day by oral route as needed.    CLOTRIMAZOLE (LOTRIMIN) 1 % CREAM    Apply topically 2 (two) times daily. Apply to affected area twice daily after washing with soap and water    CYCLOBENZAPRINE (FLEXERIL) 10 MG TABLET    TAKE 1 TABLET(S) 3 TIMES A DAY BY ORAL ROUTE AS NEEDED    DULOXETINE  (CYMBALTA) 60 MG CAPSULE    Take 1 capsule(s) by mouth every day    ERGOCALCIFEROL (ERGOCALCIFEROL) 50,000 UNIT CAP    Take 1 capsule (50,000 Units total) by mouth every 7 days.    ESTRADIOL (ESTRACE) 0.01 % (0.1 MG/GRAM) VAGINAL CREAM    Place 1 g vaginally twice a week.    FLUTICASONE PROPIONATE (FLONASE) 50 MCG/ACTUATION NASAL SPRAY    2 sprays in each nostril once a day    FLUTICASONE-SALMETEROL DISKUS INHALER 250-50 MCG    Inhale 1 puff twice a day by inhalation route as directed.    FUROSEMIDE (LASIX) 20 MG TABLET    Take 1 tablet by mouth every day in the morning.    GABAPENTIN (NEURONTIN) 800 MG TABLET    Take 1 tablet(s) by mouth 3 times a day    HYDROCHLOROTHIAZIDE (HYDRODIURIL) 25 MG TABLET    TAKE 1 TABLET(S) BY MOUTH EVERY DAY    HYDROCODONE-ACETAMINOPHEN (NORCO)  MG PER TABLET    Take 1 tablet by mouth every 6 (six) hours as needed for Pain.    IBUPROFEN (ADVIL,MOTRIN) 800 MG TABLET    TAKE 1 TABLET(S) EVERY 8 HOURS BY ORAL ROUTE AS NEEDED.    INCRUSE ELLIPTA 62.5 MCG/ACTUATION DSDV    INHALE 1 PUFF(S) EVERY DAY BY INHALATION ROUTE.    LISINOPRIL (PRINIVIL,ZESTRIL) 40 MG TABLET    Take 1 tablet(s) by mouth every day.    LORATADINE (CLARITIN) 10 MG TABLET    TAKE 1 TABLET(S) EVERY DAY BY ORAL ROUTE IN THE MORNING.    METFORMIN (GLUCOPHAGE-XR) 500 MG ER 24HR TABLET    Take 2 tablet(s) by mouth in the morning.    METHOCARBAMOL (ROBAXIN) 750 MG TAB    Take 1 tablet(s) every 6 hours by oral route as needed.    OXYBUTYNIN (DITROPAN) 5 MG TAB    Take 5 mg by mouth 3 (three) times daily.     PANTOPRAZOLE (PROTONIX) 40 MG TABLET    Take 1 tablet every day by oral route.    SUMATRIPTAN (IMITREX) 100 MG TABLET    Take 1 tablet by mouth at the onset of headache, may repeat another dose in 1 hour, no more than 2 tablets per day.    TAMSULOSIN (FLOMAX) 0.4 MG CAP    Take 1 capsule (0.4 mg total) by mouth every evening.    TIOTROPIUM-OLODATEROL (STIOLTO RESPIMAT) 2.5-2.5 MCG/ACTUATION MIST    Inhale 2 puffs  into the lungs once daily.    TOPIRAMATE (TOPAMAX) 50 MG TABLET    Take 50 mg by mouth every evening.     TRAZODONE (DESYREL) 100 MG TABLET    Take 100 mg by mouth every evening.   Modified Medications    No medications on file   Discontinued Medications    POLYETHYLENE GLYCOL (GOLYTELY,NULYTELY) 236-22.74-6.74 -5.86 GRAM SUSPENSION    Use as directed

## 2022-02-09 ENCOUNTER — TELEPHONE (OUTPATIENT)
Dept: GASTROENTEROLOGY | Facility: CLINIC | Age: 62
End: 2022-02-09
Payer: MEDICAID

## 2022-02-15 ENCOUNTER — TELEPHONE (OUTPATIENT)
Dept: GASTROENTEROLOGY | Facility: CLINIC | Age: 62
End: 2022-02-15
Payer: MEDICAID

## 2022-02-15 NOTE — TELEPHONE ENCOUNTER
Patient called to reschedule colonoscopy due to no transportation. Patient rescheduled to 2/24/2022.

## 2022-02-15 NOTE — TELEPHONE ENCOUNTER
----- Message from Sydney Canales sent at 2/15/2022 10:34 AM CST -----  Contact: [phone]  Type:  Needs Medical Advice    Who Called: pt called  Would the patient rather a call back or a response via [facility]? Call back  Best Call Back Number: [phone]  Additional Information: pt needs to reschedule appt for Colonoscopy. Please call pt to advice

## 2022-02-21 ENCOUNTER — LAB VISIT (OUTPATIENT)
Dept: FAMILY MEDICINE | Facility: CLINIC | Age: 62
End: 2022-02-21
Attending: FAMILY MEDICINE
Payer: MEDICAID

## 2022-02-21 DIAGNOSIS — Z01.818 PREOPERATIVE EXAMINATION: ICD-10-CM

## 2022-02-21 PROCEDURE — U0005 INFEC AGEN DETEC AMPLI PROBE: HCPCS | Performed by: NURSE PRACTITIONER

## 2022-02-21 PROCEDURE — U0003 INFECTIOUS AGENT DETECTION BY NUCLEIC ACID (DNA OR RNA); SEVERE ACUTE RESPIRATORY SYNDROME CORONAVIRUS 2 (SARS-COV-2) (CORONAVIRUS DISEASE [COVID-19]), AMPLIFIED PROBE TECHNIQUE, MAKING USE OF HIGH THROUGHPUT TECHNOLOGIES AS DESCRIBED BY CMS-2020-01-R: HCPCS | Performed by: NURSE PRACTITIONER

## 2022-02-22 LAB
SARS-COV-2 RNA RESP QL NAA+PROBE: NOT DETECTED
SARS-COV-2- CYCLE NUMBER: NORMAL

## 2022-03-10 RX ORDER — METHOCARBAMOL 750 MG/1
TABLET, FILM COATED ORAL
Qty: 90 TABLET | Refills: 1 | OUTPATIENT
Start: 2022-03-10

## 2022-03-17 ENCOUNTER — TELEPHONE (OUTPATIENT)
Dept: GASTROENTEROLOGY | Facility: CLINIC | Age: 62
End: 2022-03-17
Payer: MEDICAID

## 2022-03-17 NOTE — TELEPHONE ENCOUNTER
----- Message from Ninoska Carvalho MD sent at 3/10/2022  2:27 PM CST -----  4 large subtle benign adenomas, repeat 1 year, please see op note

## 2022-03-17 NOTE — TELEPHONE ENCOUNTER
Patient notified of results and repeat colonoscopy in 1 year. Patient complained of issues with constipation. Appointment scheduled.

## 2022-03-18 ENCOUNTER — OFFICE VISIT (OUTPATIENT)
Dept: GASTROENTEROLOGY | Facility: CLINIC | Age: 62
End: 2022-03-18
Payer: MEDICAID

## 2022-03-18 VITALS
WEIGHT: 259.38 LBS | HEART RATE: 81 BPM | HEIGHT: 62 IN | SYSTOLIC BLOOD PRESSURE: 165 MMHG | DIASTOLIC BLOOD PRESSURE: 77 MMHG | BODY MASS INDEX: 47.73 KG/M2

## 2022-03-18 DIAGNOSIS — E66.01 CLASS 3 SEVERE OBESITY DUE TO EXCESS CALORIES WITH SERIOUS COMORBIDITY AND BODY MASS INDEX (BMI) OF 45.0 TO 49.9 IN ADULT: ICD-10-CM

## 2022-03-18 DIAGNOSIS — R10.84 GENERALIZED ABDOMINAL PAIN: Primary | ICD-10-CM

## 2022-03-18 DIAGNOSIS — K59.09 CHRONIC CONSTIPATION: ICD-10-CM

## 2022-03-18 PROBLEM — E66.813 CLASS 3 SEVERE OBESITY DUE TO EXCESS CALORIES WITH SERIOUS COMORBIDITY AND BODY MASS INDEX (BMI) OF 45.0 TO 49.9 IN ADULT: Status: ACTIVE | Noted: 2022-03-18

## 2022-03-18 PROCEDURE — 1159F PR MEDICATION LIST DOCUMENTED IN MEDICAL RECORD: ICD-10-PCS | Mod: CPTII,,, | Performed by: NURSE PRACTITIONER

## 2022-03-18 PROCEDURE — 99999 PR PBB SHADOW E&M-EST. PATIENT-LVL V: CPT | Mod: PBBFAC,,, | Performed by: NURSE PRACTITIONER

## 2022-03-18 PROCEDURE — 4010F PR ACE/ARB THEARPY RXD/TAKEN: ICD-10-PCS | Mod: CPTII,,, | Performed by: NURSE PRACTITIONER

## 2022-03-18 PROCEDURE — 3008F BODY MASS INDEX DOCD: CPT | Mod: CPTII,,, | Performed by: NURSE PRACTITIONER

## 2022-03-18 PROCEDURE — 99214 PR OFFICE/OUTPT VISIT, EST, LEVL IV, 30-39 MIN: ICD-10-PCS | Mod: S$PBB,,, | Performed by: NURSE PRACTITIONER

## 2022-03-18 PROCEDURE — 99215 OFFICE O/P EST HI 40 MIN: CPT | Mod: PBBFAC,PO | Performed by: NURSE PRACTITIONER

## 2022-03-18 PROCEDURE — 3078F DIAST BP <80 MM HG: CPT | Mod: CPTII,,, | Performed by: NURSE PRACTITIONER

## 2022-03-18 PROCEDURE — 99214 OFFICE O/P EST MOD 30 MIN: CPT | Mod: S$PBB,,, | Performed by: NURSE PRACTITIONER

## 2022-03-18 PROCEDURE — 3077F PR MOST RECENT SYSTOLIC BLOOD PRESSURE >= 140 MM HG: ICD-10-PCS | Mod: CPTII,,, | Performed by: NURSE PRACTITIONER

## 2022-03-18 PROCEDURE — 1159F MED LIST DOCD IN RCRD: CPT | Mod: CPTII,,, | Performed by: NURSE PRACTITIONER

## 2022-03-18 PROCEDURE — 3008F PR BODY MASS INDEX (BMI) DOCUMENTED: ICD-10-PCS | Mod: CPTII,,, | Performed by: NURSE PRACTITIONER

## 2022-03-18 PROCEDURE — 4010F ACE/ARB THERAPY RXD/TAKEN: CPT | Mod: CPTII,,, | Performed by: NURSE PRACTITIONER

## 2022-03-18 PROCEDURE — 99999 PR PBB SHADOW E&M-EST. PATIENT-LVL V: ICD-10-PCS | Mod: PBBFAC,,, | Performed by: NURSE PRACTITIONER

## 2022-03-18 PROCEDURE — 3078F PR MOST RECENT DIASTOLIC BLOOD PRESSURE < 80 MM HG: ICD-10-PCS | Mod: CPTII,,, | Performed by: NURSE PRACTITIONER

## 2022-03-18 PROCEDURE — 1160F PR REVIEW ALL MEDS BY PRESCRIBER/CLIN PHARMACIST DOCUMENTED: ICD-10-PCS | Mod: CPTII,,, | Performed by: NURSE PRACTITIONER

## 2022-03-18 PROCEDURE — 3077F SYST BP >= 140 MM HG: CPT | Mod: CPTII,,, | Performed by: NURSE PRACTITIONER

## 2022-03-18 PROCEDURE — 1160F RVW MEDS BY RX/DR IN RCRD: CPT | Mod: CPTII,,, | Performed by: NURSE PRACTITIONER

## 2022-03-18 RX ORDER — DICYCLOMINE HYDROCHLORIDE 10 MG/1
10 CAPSULE ORAL 3 TIMES DAILY PRN
Qty: 60 CAPSULE | Refills: 2 | Status: SHIPPED | OUTPATIENT
Start: 2022-03-18 | End: 2023-10-20

## 2022-03-18 NOTE — PROGRESS NOTES
Subjective:       Patient ID: Halima Pruitt is a 61 y.o. female.    Chief Complaint: Constipation and Abdominal Pain    60 y/o female with hypertension, COPD, constipation, and hx of colon polyps presents to clinic for follow up. S/p colonoscopy 2/24 that showed severe diverticulosis in sigmoid colon and multiple colon polyps throughout colon resected. FH includes sister with colon cancer.      Old records from chart reviewed and summarized, significant for:  - 6/23/2017 colonoscopy revealed one 12 mm polyp in the descending colon, removed using injection-lift and a hot snare. Resected and retrieved. One 8 mm polyp in the sigmoid colon, removed with a cold snare. Resected and retrieved. Diverticulosis in the sigmoid colon and in the descending colon.       Abdominal Pain  This is a new problem. The current episode started 1 to 4 weeks ago. The onset quality is gradual. The problem occurs intermittently. The problem has been waxing and waning. The pain is located in the generalized abdominal region. The quality of the pain is aching and cramping. Associated symptoms include constipation and diarrhea. Pertinent negatives include no belching, hematochezia, melena, nausea, vomiting or weight loss. Nothing aggravates the pain. The pain is relieved by bowel movements. She has tried nothing for the symptoms. Prior diagnostic workup includes GI consult and lower endoscopy.       Past Medical History:   Diagnosis Date    Anxiety     Bilateral pneumonia 12/8/2021    COPD (chronic obstructive pulmonary disease)     Depression     HTN (hypertension), benign 6/1/2017    Seizures     last 34 yreas ago       Past Surgical History:   Procedure Laterality Date    CHOLECYSTECTOMY      COLONOSCOPY N/A 6/23/2017    Procedure: COLONOSCOPY;  Surgeon: Honey Flores MD;  Location: [facility];  Service: Endoscopy;  Laterality: N/A;    COLONOSCOPY N/A 2/24/2022    Procedure: COLONOSCOPY;  Surgeon: Ninoska Carvalho MD;   "Location: [facility] ENDO;  Service: Endoscopy;  Laterality: N/A;    HYSTERECTOMY      LAPAROSCOPIC APPENDECTOMY N/A 8/11/2018    Procedure: APPENDECTOMY, LAPAROSCOPIC;  Surgeon: Contreras Street MD;  Location: [facility] OR;  Service: General;  Laterality: N/A;    pilonidal cystectomy         History reviewed. No pertinent family history.    Social History     Socioeconomic History    Marital status:    Tobacco Use    Smoking status: Current Every Day Smoker     Packs/day: 1.00     Years: 40.00     Pack years: 40.00     Types: Cigarettes    Smokeless tobacco: Never Used   Substance and Sexual Activity    Alcohol use: Yes     Comment: once in a while    Drug use: No       Review of Systems   Constitutional: Negative for appetite change, unexpected weight change and weight loss.   HENT: Negative for trouble swallowing.    Respiratory: Negative for shortness of breath.    Cardiovascular: Negative for chest pain.   Gastrointestinal: Positive for abdominal pain, constipation and diarrhea. Negative for hematochezia, melena, nausea and vomiting.   Musculoskeletal: Negative for back pain.   Hematological: Negative for adenopathy.   Psychiatric/Behavioral: Negative for dysphoric mood.         Objective:     Vitals:    03/18/22 1014   BP: (!) 165/77   Pulse: 81   Weight: 117.7 kg (259 lb 6.4 oz)   Height: 5' 2" (1.575 m)          Physical Exam  Constitutional:       General: She is not in acute distress.     Appearance: She is obese. She is not ill-appearing.   HENT:      Head: Normocephalic.   Eyes:      Conjunctiva/sclera: Conjunctivae normal.      Pupils: Pupils are equal, round, and reactive to light.   Pulmonary:      Effort: Pulmonary effort is normal. No respiratory distress.   Musculoskeletal:         General: No swelling.      Cervical back: Normal range of motion.   Skin:     General: Skin is warm and dry.   Neurological:      Mental Status: She is alert and oriented to person, place, and time. "   Psychiatric:         Mood and Affect: Mood normal.         Behavior: Behavior normal.               Assessment:         ICD-10-CM ICD-9-CM   1. Generalized abdominal pain  R10.84 789.07   2. Chronic constipation  K59.09 564.00   3. Class 3 severe obesity due to excess calories with serious comorbidity and body mass index (BMI) of 45.0 to 49.9 in adult  E66.01 278.01    Z68.42 V85.42       Plan:       Generalized abdominal pain  -     dicyclomine (BENTYL) 10 MG capsule; Take 1 capsule (10 mg total) by mouth 3 (three) times daily as needed (abdominal pain).  Dispense: 60 capsule; Refill: 2    Chronic constipation    Class 3 severe obesity due to excess calories with serious comorbidity and body mass index (BMI) of 45.0 to 49.9 in adult    - Take dicyclomine 10 mg at least daily; may take up to three times per day as needed for abdominal pain.   - Decrease Miralax to 1 capful every other day   - Continue fiber supplement daily  - Weight loss advised. Dietary and exercise counseling done.    Follow up for If symptoms worsen or fail to improve, medication management.     Patient's Medications   New Prescriptions    DICYCLOMINE (BENTYL) 10 MG CAPSULE    Take 1 capsule (10 mg total) by mouth 3 (three) times daily as needed (abdominal pain).   Previous Medications    ALBUTEROL (PROVENTIL/VENTOLIN HFA) 90 MCG/ACTUATION INHALER    Inhale 2 puffs by mouth every 4 hours by inhalation route as needed.    ALBUTEROL-IPRATROPIUM (DUO-NEB) 2.5 MG-0.5 MG/3 ML NEBULIZER SOLUTION    Take 3 mLs by nebulization every 6 (six) hours as needed for Wheezing or Shortness of Breath. Rescue    AMMONIUM LACTATE 12 % CREA    Apply 1 application twice a day by topical route.    ASPIRIN 81 MG CHEW    Take 81 mg by mouth once daily.    BUSPIRONE (BUSPAR) 30 MG TAB    Take 1 tablet(s) twice a day by oral route as needed.    CLOTRIMAZOLE (LOTRIMIN) 1 % CREAM    Apply topically 2 (two) times daily. Apply to affected area twice daily after washing  with soap and water    CYCLOBENZAPRINE (FLEXERIL) 10 MG TABLET    TAKE 1 TABLET(S) 3 TIMES A DAY BY ORAL ROUTE AS NEEDED    DULOXETINE (CYMBALTA) 60 MG CAPSULE    Take 1 capsule(s) by mouth every day    ESTRADIOL (ESTRACE) 0.01 % (0.1 MG/GRAM) VAGINAL CREAM    Place 1 g vaginally twice a week.    FLUTICASONE PROPIONATE (FLONASE) 50 MCG/ACTUATION NASAL SPRAY    2 sprays in each nostril once a day    FLUTICASONE-SALMETEROL DISKUS INHALER 250-50 MCG    Inhale 1 puff twice a day by inhalation route as directed.    FUROSEMIDE (LASIX) 20 MG TABLET    Take 1 tablet by mouth every day in the morning.    GABAPENTIN (NEURONTIN) 800 MG TABLET    Take 1 tablet(s) by mouth 3 times a day    IBUPROFEN (ADVIL,MOTRIN) 800 MG TABLET    TAKE 1 TABLET(S) EVERY 8 HOURS BY ORAL ROUTE AS NEEDED.    LISINOPRIL (PRINIVIL,ZESTRIL) 40 MG TABLET    Take 1 tablet(s) by mouth every day.    LORATADINE (CLARITIN) 10 MG TABLET    TAKE 1 TABLET(S) EVERY DAY BY ORAL ROUTE IN THE MORNING.    METFORMIN (GLUCOPHAGE-XR) 500 MG ER 24HR TABLET    Take 2 tablet(s) by mouth in the morning.    METHOCARBAMOL (ROBAXIN) 750 MG TAB    Take 1 tablet(s) every 6 hours by oral route as needed.    POLYETHYLENE GLYCOL (GLYCOLAX) 17 GRAM/DOSE POWDER    Mix 1 capful (17 g) with a liquid and take by mouth once daily.    SUMATRIPTAN (IMITREX) 100 MG TABLET    Take 1 tablet by mouth at the onset of headache, may repeat another dose in 1 hour, no more than 2 tablets per day.    TRAZODONE (DESYREL) 100 MG TABLET    Take 100 mg by mouth every evening.   Modified Medications    No medications on file   Discontinued Medications    No medications on file

## 2022-03-18 NOTE — PATIENT INSTRUCTIONS
- Take dicyclomine 10 mg at least daily; may take up to three times per day as needed for abdominal pain.   - Decrease Miralax to 1 capful every other day   - Continue fiber supplement daily

## 2022-12-05 LAB
CHOLEST SERPL-MSCNC: 130 MG/DL (ref 0–200)
HBA1C MFR BLD: 5.8 % (ref 4–6)
HDLC SERPL-MCNC: 46 MG/DL (ref 35–70)
HIV ANTIGEN/ANTIBODY: NON REACTIVE
LDLC SERPL CALC-MCNC: 63 MG/DL (ref 0–160)
TRIGL SERPL-MCNC: 119 MG/DL (ref 40–160)

## 2023-01-04 PROBLEM — M54.42 CHRONIC BILATERAL LOW BACK PAIN WITH BILATERAL SCIATICA: Status: ACTIVE | Noted: 2023-01-04

## 2023-01-04 PROBLEM — R29.898 BILATERAL LEG WEAKNESS: Status: ACTIVE | Noted: 2023-01-04

## 2023-01-04 PROBLEM — Z74.09 IMPAIRED MOBILITY AND ADLS: Status: RESOLVED | Noted: 2018-11-26 | Resolved: 2023-01-04

## 2023-01-04 PROBLEM — Z78.9 IMPAIRED MOBILITY AND ADLS: Status: RESOLVED | Noted: 2018-11-26 | Resolved: 2023-01-04

## 2023-01-04 PROBLEM — M53.86 DECREASED RANGE OF MOTION OF LUMBAR SPINE: Status: ACTIVE | Noted: 2023-01-04

## 2023-01-04 PROBLEM — M54.41 CHRONIC BILATERAL LOW BACK PAIN WITH BILATERAL SCIATICA: Status: ACTIVE | Noted: 2023-01-04

## 2023-01-04 PROBLEM — Z78.9 POOR TOLERANCE FOR ACTIVITY: Status: RESOLVED | Noted: 2018-11-26 | Resolved: 2023-01-04

## 2023-01-04 PROBLEM — G89.29 CHRONIC BILATERAL LOW BACK PAIN WITH BILATERAL SCIATICA: Status: ACTIVE | Noted: 2023-01-04

## 2023-03-20 ENCOUNTER — OFFICE VISIT (OUTPATIENT)
Dept: GASTROENTEROLOGY | Facility: CLINIC | Age: 63
End: 2023-03-20
Payer: MEDICAID

## 2023-03-20 VITALS — BODY MASS INDEX: 44.81 KG/M2 | WEIGHT: 245 LBS | DIASTOLIC BLOOD PRESSURE: 84 MMHG | SYSTOLIC BLOOD PRESSURE: 136 MMHG

## 2023-03-20 DIAGNOSIS — Z86.010 PERSONAL HISTORY OF COLONIC POLYPS: ICD-10-CM

## 2023-03-20 DIAGNOSIS — R10.84 ABDOMINAL PAIN, GENERALIZED: ICD-10-CM

## 2023-03-20 DIAGNOSIS — K59.04 CHRONIC IDIOPATHIC CONSTIPATION: Primary | ICD-10-CM

## 2023-03-20 PROCEDURE — 3079F DIAST BP 80-89 MM HG: CPT | Mod: CPTII,,, | Performed by: INTERNAL MEDICINE

## 2023-03-20 PROCEDURE — 3075F PR MOST RECENT SYSTOLIC BLOOD PRESS GE 130-139MM HG: ICD-10-PCS | Mod: CPTII,,, | Performed by: INTERNAL MEDICINE

## 2023-03-20 PROCEDURE — 4010F PR ACE/ARB THEARPY RXD/TAKEN: ICD-10-PCS | Mod: CPTII,,, | Performed by: INTERNAL MEDICINE

## 2023-03-20 PROCEDURE — 1160F RVW MEDS BY RX/DR IN RCRD: CPT | Mod: CPTII,,, | Performed by: INTERNAL MEDICINE

## 2023-03-20 PROCEDURE — 99214 PR OFFICE/OUTPT VISIT, EST, LEVL IV, 30-39 MIN: ICD-10-PCS | Mod: S$PBB,,, | Performed by: INTERNAL MEDICINE

## 2023-03-20 PROCEDURE — 99999 PR PBB SHADOW E&M-EST. PATIENT-LVL V: CPT | Mod: PBBFAC,,, | Performed by: INTERNAL MEDICINE

## 2023-03-20 PROCEDURE — 3079F PR MOST RECENT DIASTOLIC BLOOD PRESSURE 80-89 MM HG: ICD-10-PCS | Mod: CPTII,,, | Performed by: INTERNAL MEDICINE

## 2023-03-20 PROCEDURE — 1159F PR MEDICATION LIST DOCUMENTED IN MEDICAL RECORD: ICD-10-PCS | Mod: CPTII,,, | Performed by: INTERNAL MEDICINE

## 2023-03-20 PROCEDURE — 3008F BODY MASS INDEX DOCD: CPT | Mod: CPTII,,, | Performed by: INTERNAL MEDICINE

## 2023-03-20 PROCEDURE — 1160F PR REVIEW ALL MEDS BY PRESCRIBER/CLIN PHARMACIST DOCUMENTED: ICD-10-PCS | Mod: CPTII,,, | Performed by: INTERNAL MEDICINE

## 2023-03-20 PROCEDURE — 4010F ACE/ARB THERAPY RXD/TAKEN: CPT | Mod: CPTII,,, | Performed by: INTERNAL MEDICINE

## 2023-03-20 PROCEDURE — 99214 OFFICE O/P EST MOD 30 MIN: CPT | Mod: S$PBB,,, | Performed by: INTERNAL MEDICINE

## 2023-03-20 PROCEDURE — 99999 PR PBB SHADOW E&M-EST. PATIENT-LVL V: ICD-10-PCS | Mod: PBBFAC,,, | Performed by: INTERNAL MEDICINE

## 2023-03-20 PROCEDURE — 3075F SYST BP GE 130 - 139MM HG: CPT | Mod: CPTII,,, | Performed by: INTERNAL MEDICINE

## 2023-03-20 PROCEDURE — 3008F PR BODY MASS INDEX (BMI) DOCUMENTED: ICD-10-PCS | Mod: CPTII,,, | Performed by: INTERNAL MEDICINE

## 2023-03-20 PROCEDURE — 99215 OFFICE O/P EST HI 40 MIN: CPT | Mod: PBBFAC,PO | Performed by: INTERNAL MEDICINE

## 2023-03-20 PROCEDURE — 1159F MED LIST DOCD IN RCRD: CPT | Mod: CPTII,,, | Performed by: INTERNAL MEDICINE

## 2023-03-21 PROBLEM — R10.84 ABDOMINAL PAIN, GENERALIZED: Status: ACTIVE | Noted: 2023-03-21

## 2023-03-21 PROBLEM — Z86.0100 PERSONAL HISTORY OF COLONIC POLYPS: Status: ACTIVE | Noted: 2022-02-08

## 2023-03-21 NOTE — PROGRESS NOTES
Subjective:       Patient ID: Halima Pruitt is a 62 y.o. female.    Chief Complaint: Colonoscopy and Constipation    63 yo F here for f/u constipation.  She has been taking Miralax daily and fiber daily for one year now.  She has 2-3 BM per day which are small and always associated with incomplete evacuation.  In the mornings she notes her appetite is decreased.  She continues to have lower and generalized abdominal pain that can be very severe, then followed by a BM which makes her feel better.  That pain occurs at least twice per week.    She is due for repeat colonoscopy due to h/o large adenomatous polyps and insufficient prep despite 2 day prep.    Review of Systems   Constitutional:  Negative for chills and fever.   Respiratory:  Negative for shortness of breath and wheezing.    Cardiovascular:  Negative for chest pain, palpitations and leg swelling.   Gastrointestinal:  Positive for abdominal pain and constipation.   Neurological:  Negative for dizziness and speech difficulty.       Objective:      /84 (BP Location: Right arm, Patient Position: Sitting, BP Method: Large (Manual))   Wt 111.1 kg (245 lb)   BMI 44.81 kg/m²     Physical Exam  Constitutional:       Appearance: Normal appearance. She is well-developed.   HENT:      Head: Normocephalic and atraumatic.   Eyes:      Extraocular Movements: Extraocular movements intact.      Pupils: Pupils are equal, round, and reactive to light.   Pulmonary:      Effort: Pulmonary effort is normal. No respiratory distress.   Abdominal:      General: There is no distension.      Palpations: Abdomen is soft.   Musculoskeletal:         General: No signs of injury. Normal range of motion.   Neurological:      General: No focal deficit present.      Mental Status: She is alert and oriented to person, place, and time.   Psychiatric:         Mood and Affect: Mood normal.         Behavior: Behavior normal.         Thought Content: Thought content normal.          "Judgment: Judgment normal.       Lab Results   Component Value Date    WBC 18.75 (H) 12/13/2021    HGB 15.6 12/13/2021    HCT 50.6 (H) 12/13/2021    MCV 95 12/13/2021     12/13/2021     CXR was independently visualized and reviewed by me and showed enlarged cardiac silhouette, pulmonary edema vs pneumonia.    Old records from chart reviewed and summarized, significant for:  Colonoscopy 2/2022:  only fair prep despite 2 day prep; severe diverticulosis sigmoid, 4 large TA, redundant colon, IH  Colonoscopy 6/2017:  fair prep, left sided diverticulosis, large TA    Assessment:       Problem List Items Addressed This Visit          GI    Personal history of colonic polyps    Chronic idiopathic constipation - Primary    Relevant Orders    X-Ray Abdomen Flat And Erect (Completed)    Abdominal pain, generalized         Plan:       Chronic idiopathic constipation; Abdominal pain, generalized  -     X-Ray Abdomen Flat And Erect; Future; Expected date: 03/20/2023  -     I would like to have better control over her constipation before scheduling repeat colonoscopy.  -     Recs will be on the KUB result note    Personal history of colonic polyps        -     We will schedule in the next few weeks.  Exact prep will depend on how well she does with agent chosen for constipation, but it will be aggressive        ADDENDUM:  KUB showed large amount of stool.  I am recommending MOM clean out and Linzess  -     linaCLOtide (LINZESS) 145 mcg Cap capsule; Take 1 capsule (145 mcg total) by mouth once daily.  Dispense: 30 capsule; Refill: 11  -     It is common for Linzess to cause diarrhea the first 7-10 days after starting it.  Linzess is always more effective the first 7-10 days of use, and it then "wears off" afterward, meaning that the diarrhea improves.  The goal is 1 BM every day, or every other day, without "accidents" or urgency.  It is ok if the BM are still loose or watery as long as it is not more than once per day, with " accidents, or urgent.  If there are no good BM after taking this dose for 4 days, DOUBLE the dose and take two of the pills together each morning.

## 2023-03-24 DIAGNOSIS — K59.04 CHRONIC IDIOPATHIC CONSTIPATION: ICD-10-CM

## 2023-04-14 NOTE — TELEPHONE ENCOUNTER
Patient has been taking 2 of the Linzess 145 mcg. Patient needs a prescription for Linzess 290 mcg.

## 2023-04-14 NOTE — TELEPHONE ENCOUNTER
----- Message from Akira De Los Santos sent at 4/14/2023  1:05 PM CDT -----  Contact: pt  .Type:  Needs Medical Advice    Who Called: pt    Pharmacy name and phone #:  [facility]   Phone: [phone]  Fax:  [phone]  Would the patient rather a call back or a response via [facility]?  Call back  Best Call Back Number: [phone]   Additional Information:  Pt. Is requesting a  new script for the linaCLOtide (LINZESS) 145 mcg Cap capsule.  Because she takes it twice a day.

## 2023-05-19 RX ORDER — LISINOPRIL 40 MG/1
TABLET ORAL
Qty: 90 TABLET | Refills: 1 | Status: CANCELLED | OUTPATIENT
Start: 2023-05-19

## 2023-06-27 ENCOUNTER — PATIENT MESSAGE (OUTPATIENT)
Dept: GASTROENTEROLOGY | Facility: CLINIC | Age: 63
End: 2023-06-27
Payer: MEDICAID

## 2023-07-25 ENCOUNTER — PATIENT OUTREACH (OUTPATIENT)
Dept: ADMINISTRATIVE | Facility: HOSPITAL | Age: 63
End: 2023-07-25
Payer: MEDICAID

## 2023-07-25 NOTE — PROGRESS NOTES
Care Everywhere updates requested and reviewed.  Immunizations reconciled. Media reports reviewed.  Duplicate HM overrides and  orders removed.  Overdue HM topic chart audit and/or requested.  Overdue lab testing linked to upcoming lab appointments if applies.        Lab huseyin, and [employer] reviewed   Pap Smear and Lab testing      DIS reviewed    Mammogram     Health Maintenance Due   Topic Date Due    Hepatitis C Screening  Never done    Mammogram  2020    COVID-19 Vaccine (5 - Pfizer series) 2022    Colorectal Cancer Screening  2023    Pneumococcal Vaccines (Age 0-64) (2 - PCV) 2023

## 2023-10-31 ENCOUNTER — PATIENT MESSAGE (OUTPATIENT)
Dept: GASTROENTEROLOGY | Facility: CLINIC | Age: 63
End: 2023-10-31
Payer: MEDICAID

## 2023-12-01 ENCOUNTER — PATIENT MESSAGE (OUTPATIENT)
Dept: GASTROENTEROLOGY | Facility: CLINIC | Age: 63
End: 2023-12-01
Payer: MEDICAID

## 2024-12-10 ENCOUNTER — PATIENT MESSAGE (OUTPATIENT)
Dept: GASTROENTEROLOGY | Facility: CLINIC | Age: 64
End: 2024-12-10
Payer: MEDICAID

## (undated) DEVICE — SCISSOR 5MMX35CM DIRECT DRIVE

## (undated) DEVICE — SEALER LIGASURE LAP 37CM 5MM

## (undated) DEVICE — SEE MEDLINE ITEM 156952

## (undated) DEVICE — GLOVE BIOGEL ECLIPSE SZ 7.5

## (undated) DEVICE — IRRIGATOR ENDOSCOPY DISP.

## (undated) DEVICE — SUT 0 VICRYL / UR6 (J603)

## (undated) DEVICE — MANIFOLD 4 PORT

## (undated) DEVICE — TROCAR ENDOPATH XCEL 5MM 7.5CM

## (undated) DEVICE — NDL 22GA X1 1/2 REG BEVEL

## (undated) DEVICE — TROCAR ENDOPATH XCEL 5X75MM

## (undated) DEVICE — DRESSING TEGADERM 2 3/8 X 2.75

## (undated) DEVICE — NDL INSUF ULTRA VERESS 120MM

## (undated) DEVICE — SUT MONOCRYL 4-0 PS-2

## (undated) DEVICE — TRAY FOLEY 16FR INFECTION CONT

## (undated) DEVICE — BAG TISSUE RETRIEVAL 225ML

## (undated) DEVICE — SEE MEDLINE ITEM 146372

## (undated) DEVICE — TROCAR ENDOPATH XCEL 12X100MM

## (undated) DEVICE — ELECTRODE REM PLYHSV RETURN 9

## (undated) DEVICE — SUPPORT ULNA NERVE PROTECTOR

## (undated) DEVICE — SPONGE DERMA 8PLY 2X2